# Patient Record
Sex: FEMALE | Race: OTHER | NOT HISPANIC OR LATINO | URBAN - METROPOLITAN AREA
[De-identification: names, ages, dates, MRNs, and addresses within clinical notes are randomized per-mention and may not be internally consistent; named-entity substitution may affect disease eponyms.]

---

## 2018-03-31 ENCOUNTER — INPATIENT (INPATIENT)
Facility: HOSPITAL | Age: 48
LOS: 32 days | Discharge: HOME | End: 2018-05-03
Attending: PSYCHIATRY & NEUROLOGY | Admitting: PSYCHIATRY & NEUROLOGY
Payer: MEDICARE

## 2018-03-31 VITALS — SYSTOLIC BLOOD PRESSURE: 170 MMHG | DIASTOLIC BLOOD PRESSURE: 103 MMHG | RESPIRATION RATE: 16 BRPM | HEART RATE: 120 BPM

## 2018-03-31 DIAGNOSIS — F25.0 SCHIZOAFFECTIVE DISORDER, BIPOLAR TYPE: ICD-10-CM

## 2018-03-31 DIAGNOSIS — R69 ILLNESS, UNSPECIFIED: ICD-10-CM

## 2018-03-31 DIAGNOSIS — F29 UNSPECIFIED PSYCHOSIS NOT DUE TO A SUBSTANCE OR KNOWN PHYSIOLOGICAL CONDITION: ICD-10-CM

## 2018-03-31 DIAGNOSIS — F10.11 ALCOHOL ABUSE, IN REMISSION: ICD-10-CM

## 2018-03-31 LAB
ALBUMIN SERPL ELPH-MCNC: 4.8 G/DL — SIGNIFICANT CHANGE UP (ref 3.3–5)
ALP SERPL-CCNC: 107 U/L — SIGNIFICANT CHANGE UP (ref 40–120)
ALT FLD-CCNC: 21 U/L — SIGNIFICANT CHANGE UP (ref 4–33)
AMPHET UR-MCNC: NEGATIVE — SIGNIFICANT CHANGE UP
APAP SERPL-MCNC: < 15 UG/ML — LOW (ref 15–25)
APPEARANCE UR: CLEAR — SIGNIFICANT CHANGE UP
AST SERPL-CCNC: 19 U/L — SIGNIFICANT CHANGE UP (ref 4–32)
BACTERIA # UR AUTO: HIGH
BARBITURATES UR SCN-MCNC: NEGATIVE — SIGNIFICANT CHANGE UP
BASOPHILS # BLD AUTO: 0.04 K/UL — SIGNIFICANT CHANGE UP (ref 0–0.2)
BASOPHILS NFR BLD AUTO: 0.5 % — SIGNIFICANT CHANGE UP (ref 0–2)
BENZODIAZ UR-MCNC: NEGATIVE — SIGNIFICANT CHANGE UP
BILIRUB SERPL-MCNC: 0.4 MG/DL — SIGNIFICANT CHANGE UP (ref 0.2–1.2)
BILIRUB UR-MCNC: NEGATIVE — SIGNIFICANT CHANGE UP
BLOOD UR QL VISUAL: HIGH
BUN SERPL-MCNC: 8 MG/DL — SIGNIFICANT CHANGE UP (ref 7–23)
CALCIUM SERPL-MCNC: 9.3 MG/DL — SIGNIFICANT CHANGE UP (ref 8.4–10.5)
CANNABINOIDS UR-MCNC: NEGATIVE — SIGNIFICANT CHANGE UP
CHLORIDE SERPL-SCNC: 101 MMOL/L — SIGNIFICANT CHANGE UP (ref 98–107)
CO2 SERPL-SCNC: 21 MMOL/L — LOW (ref 22–31)
COCAINE METAB.OTHER UR-MCNC: NEGATIVE — SIGNIFICANT CHANGE UP
COLOR SPEC: YELLOW — SIGNIFICANT CHANGE UP
CREAT SERPL-MCNC: 0.89 MG/DL — SIGNIFICANT CHANGE UP (ref 0.5–1.3)
EOSINOPHIL # BLD AUTO: 0.12 K/UL — SIGNIFICANT CHANGE UP (ref 0–0.5)
EOSINOPHIL NFR BLD AUTO: 1.4 % — SIGNIFICANT CHANGE UP (ref 0–6)
ETHANOL BLD-MCNC: < 10 MG/DL — SIGNIFICANT CHANGE UP
GLUCOSE SERPL-MCNC: 148 MG/DL — HIGH (ref 70–99)
GLUCOSE UR-MCNC: NEGATIVE — SIGNIFICANT CHANGE UP
HCG SERPL-ACNC: < 5 MIU/ML — SIGNIFICANT CHANGE UP
HCT VFR BLD CALC: 45.3 % — HIGH (ref 34.5–45)
HGB BLD-MCNC: 15.1 G/DL — SIGNIFICANT CHANGE UP (ref 11.5–15.5)
IMM GRANULOCYTES # BLD AUTO: 0.03 # — SIGNIFICANT CHANGE UP
IMM GRANULOCYTES NFR BLD AUTO: 0.3 % — SIGNIFICANT CHANGE UP (ref 0–1.5)
KETONES UR-MCNC: NEGATIVE — SIGNIFICANT CHANGE UP
LEUKOCYTE ESTERASE UR-ACNC: NEGATIVE — SIGNIFICANT CHANGE UP
LYMPHOCYTES # BLD AUTO: 2.65 K/UL — SIGNIFICANT CHANGE UP (ref 1–3.3)
LYMPHOCYTES # BLD AUTO: 30.2 % — SIGNIFICANT CHANGE UP (ref 13–44)
MCHC RBC-ENTMCNC: 32.3 PG — SIGNIFICANT CHANGE UP (ref 27–34)
MCHC RBC-ENTMCNC: 33.3 % — SIGNIFICANT CHANGE UP (ref 32–36)
MCV RBC AUTO: 96.8 FL — SIGNIFICANT CHANGE UP (ref 80–100)
METHADONE UR-MCNC: NEGATIVE — SIGNIFICANT CHANGE UP
MONOCYTES # BLD AUTO: 0.8 K/UL — SIGNIFICANT CHANGE UP (ref 0–0.9)
MONOCYTES NFR BLD AUTO: 9.1 % — SIGNIFICANT CHANGE UP (ref 2–14)
MUCOUS THREADS # UR AUTO: SIGNIFICANT CHANGE UP
NEUTROPHILS # BLD AUTO: 5.13 K/UL — SIGNIFICANT CHANGE UP (ref 1.8–7.4)
NEUTROPHILS NFR BLD AUTO: 58.5 % — SIGNIFICANT CHANGE UP (ref 43–77)
NITRITE UR-MCNC: NEGATIVE — SIGNIFICANT CHANGE UP
NRBC # FLD: 0 — SIGNIFICANT CHANGE UP
OPIATES UR-MCNC: NEGATIVE — SIGNIFICANT CHANGE UP
OXYCODONE UR-MCNC: NEGATIVE — SIGNIFICANT CHANGE UP
PCP UR-MCNC: NEGATIVE — SIGNIFICANT CHANGE UP
PH UR: 6.5 — SIGNIFICANT CHANGE UP (ref 5–8)
PLATELET # BLD AUTO: 214 K/UL — SIGNIFICANT CHANGE UP (ref 150–400)
PMV BLD: 12.3 FL — SIGNIFICANT CHANGE UP (ref 7–13)
POTASSIUM SERPL-MCNC: 4.1 MMOL/L — SIGNIFICANT CHANGE UP (ref 3.5–5.3)
POTASSIUM SERPL-SCNC: 4.1 MMOL/L — SIGNIFICANT CHANGE UP (ref 3.5–5.3)
PROT SERPL-MCNC: 7.8 G/DL — SIGNIFICANT CHANGE UP (ref 6–8.3)
PROT UR-MCNC: 30 MG/DL — HIGH
RBC # BLD: 4.68 M/UL — SIGNIFICANT CHANGE UP (ref 3.8–5.2)
RBC # FLD: 12 % — SIGNIFICANT CHANGE UP (ref 10.3–14.5)
RBC CASTS # UR COMP ASSIST: HIGH (ref 0–?)
SALICYLATES SERPL-MCNC: < 5 MG/DL — LOW (ref 15–30)
SODIUM SERPL-SCNC: 140 MMOL/L — SIGNIFICANT CHANGE UP (ref 135–145)
SP GR SPEC: 1.01 — SIGNIFICANT CHANGE UP (ref 1–1.04)
SQUAMOUS # UR AUTO: SIGNIFICANT CHANGE UP
TSH SERPL-MCNC: 4.12 UIU/ML — SIGNIFICANT CHANGE UP (ref 0.27–4.2)
UROBILINOGEN FLD QL: NORMAL MG/DL — SIGNIFICANT CHANGE UP
WBC # BLD: 8.77 K/UL — SIGNIFICANT CHANGE UP (ref 3.8–10.5)
WBC # FLD AUTO: 8.77 K/UL — SIGNIFICANT CHANGE UP (ref 3.8–10.5)
WBC UR QL: SIGNIFICANT CHANGE UP (ref 0–?)

## 2018-03-31 PROCEDURE — 99285 EMERGENCY DEPT VISIT HI MDM: CPT

## 2018-03-31 RX ORDER — OLANZAPINE 15 MG/1
10 TABLET, FILM COATED ORAL AT BEDTIME
Qty: 0 | Refills: 0 | Status: DISCONTINUED | OUTPATIENT
Start: 2018-03-31 | End: 2018-04-04

## 2018-03-31 RX ORDER — HALOPERIDOL DECANOATE 100 MG/ML
5 INJECTION INTRAMUSCULAR EVERY 6 HOURS
Qty: 0 | Refills: 0 | Status: DISCONTINUED | OUTPATIENT
Start: 2018-03-31 | End: 2018-05-03

## 2018-03-31 RX ORDER — DIPHENHYDRAMINE HCL 50 MG
50 CAPSULE ORAL EVERY 6 HOURS
Qty: 0 | Refills: 0 | Status: DISCONTINUED | OUTPATIENT
Start: 2018-03-31 | End: 2018-05-03

## 2018-03-31 RX ORDER — LEVOTHYROXINE SODIUM 125 MCG
25 TABLET ORAL DAILY
Qty: 0 | Refills: 0 | Status: DISCONTINUED | OUTPATIENT
Start: 2018-03-31 | End: 2018-03-31

## 2018-03-31 RX ORDER — CLONAZEPAM 1 MG
1 TABLET ORAL AT BEDTIME
Qty: 0 | Refills: 0 | Status: DISCONTINUED | OUTPATIENT
Start: 2018-03-31 | End: 2018-04-04

## 2018-03-31 RX ORDER — HALOPERIDOL DECANOATE 100 MG/ML
5 INJECTION INTRAMUSCULAR ONCE
Qty: 0 | Refills: 0 | Status: DISCONTINUED | OUTPATIENT
Start: 2018-03-31 | End: 2018-05-03

## 2018-03-31 RX ORDER — DIPHENHYDRAMINE HCL 50 MG
50 CAPSULE ORAL ONCE
Qty: 0 | Refills: 0 | Status: DISCONTINUED | OUTPATIENT
Start: 2018-03-31 | End: 2018-05-03

## 2018-03-31 RX ORDER — ATORVASTATIN CALCIUM 80 MG/1
20 TABLET, FILM COATED ORAL AT BEDTIME
Qty: 0 | Refills: 0 | Status: DISCONTINUED | OUTPATIENT
Start: 2018-03-31 | End: 2018-03-31

## 2018-03-31 RX ADMIN — Medication 1 MILLIGRAM(S): at 20:32

## 2018-03-31 RX ADMIN — Medication 2 MILLIGRAM(S): at 14:57

## 2018-03-31 NOTE — ED BEHAVIORAL HEALTH ASSESSMENT NOTE - DESCRIPTION
HLD, hypothyroidism see hpi agitated irritable   ICU Vital Signs Last 24 Hrs  T(C): 36.8 (31 Mar 2018 14:56), Max: 36.8 (31 Mar 2018 14:56)  T(F): 98.2 (31 Mar 2018 14:56), Max: 98.2 (31 Mar 2018 14:56)  HR: 83 (31 Mar 2018 16:09) (83 - 126)  BP: 141/89 (31 Mar 2018 14:56) (141/89 - 170/103)  BP(mean): --  ABP: --  ABP(mean): --  RR: 16 (31 Mar 2018 14:56) (16 - 16)  SpO2: 100% (31 Mar 2018 14:56) (100% - 100%) agitated, irritable     ICU Vital Signs Last 24 Hrs  T(C): 36.8 (31 Mar 2018 14:56), Max: 36.8 (31 Mar 2018 14:56)  T(F): 98.2 (31 Mar 2018 14:56), Max: 98.2 (31 Mar 2018 14:56)  HR: 83 (31 Mar 2018 16:09) (83 - 126)  BP: 141/89 (31 Mar 2018 14:56) (141/89 - 170/103)  BP(mean): --  ABP: --  ABP(mean): --  RR: 16 (31 Mar 2018 14:56) (16 - 16)  SpO2: 100% (31 Mar 2018 14:56) (100% - 100%)

## 2018-03-31 NOTE — ED BEHAVIORAL HEALTH ASSESSMENT NOTE - OTHER PAST PSYCHIATRIC HISTORY (INCLUDE DETAILS REGARDING ONSET, COURSE OF ILLNESS, INPATIENT/OUTPATIENT TREATMENT)
History of at least 6 inpatient psychiatric hospitalizations. History of having AOT & ACT in the past, but not now per patient.

## 2018-03-31 NOTE — ED BEHAVIORAL HEALTH ASSESSMENT NOTE - CASE SUMMARY
46 y/o single  female, domiciled in an apartment, no dependents, disabled, history of Bipolar vs Schizoaffective Disorder, per chart hx of Alcohol Abuse, history of multiple inpatient psychiatric hospitalizations, in outpatient treatment at the [facility], BIB EMS from home after a staff member at [facility] called 911 due to concern for pt's safety in context of worsening psychosis & medication noncompliance.  In ED, patient is irritable, agitated, psychotic & grandiose; she is reporting medication noncompliance but is not able to engage in safety planning due to severity of symptoms.  Patient is at high risk for harm and will benefit from inpatient psychiatric hospitalization for safety and stabilization.  Admit to [place] [id number] on a 9.39, involuntary legal status.  No need for constant observation in a locked, supervised setting.  EMS transportation to unit with buckle guard for safety.

## 2018-03-31 NOTE — ED BEHAVIORAL HEALTH ASSESSMENT NOTE - PSYCHIATRIC ISSUES AND PLAN (INCLUDE STANDING AND PRN MEDICATION)
Start Zyprexa 10mg q HS for psychosis. Klonopin 1mg q HS PRN for insomnia. Provide Haldol 5mg q 6 hours PRN for agitation (IM or p.o.), Ativan 2mg q 6 hours PRN for agitation (IM or p.o.), Benadryl 50mg q 6 hours PRN for agitation (IM or p.o.) Start Zyprexa 10mg q HS for psychosis. Klonopin 1mg q HS for agitation/anxiety. Provide Haldol 5mg q 6 hours PRN for agitation (IM or p.o.), Ativan 2mg q 6 hours PRN for agitation (IM or p.o.), Benadryl 50mg q 6 hours PRN for agitation (IM or p.o.)

## 2018-03-31 NOTE — ED BEHAVIORAL HEALTH ASSESSMENT NOTE - MEDICAL ISSUES AND PLAN (INCLUDE STANDING AND PRN MEDICATION)
Restart Synthroid 25mcg qd for hypothyroidism, restart Lipitor 20mg q HS for HLD Patient was medically cleared by DAVID Miller; Unclear what medical medications patient was most recently taking, but could consider restarting Synthroid 25mcg qd for hypothyroidism, restart Lipitor 20mg q HS for HLD

## 2018-03-31 NOTE — ED ADULT NURSE NOTE - OBJECTIVE STATEMENT
Received pt in  pt  bought in by EMS & [employer] from home for non compliance of meds  & agitation, pt denies Si/Hi/AVh at present eval on going.

## 2018-03-31 NOTE — ED BEHAVIORAL HEALTH ASSESSMENT NOTE - HPI (INCLUDE ILLNESS QUALITY, SEVERITY, DURATION, TIMING, CONTEXT, MODIFYING FACTORS, ASSOCIATED SIGNS AND SYMPTOMS)
48 y/o single white female, domiciled in an apartment, no dependents, disabled, history of Bipolar vs Schizoaffective Disorder, per chart hx of Alcohol Abuse, history of multiple inpatient psychiatric hospitalizations, in outpatient treatment at the [facility], denies hx of suicide attempts, history of agitation but unclear if history of aggression, no known legal issues, a history of ACT/AOT but not known at present, denies any substance use in years, denies hx of DTs, PMH of HLD & hypothyroidism, BIB EMS from home after a staff member at [facility] called 911 due to concern for pt's safety in context of worsening psychosis & medication noncompliance.    PSYCKES was checked and did not have any relevant information as patient has Medicare.     EMS and police escorted patient into  area & provided writer with a phone number for referent named Radha Pardo, phone #[phone]. They state they received a call that patient was psychotic and medication noncompliant. However, this phone number did not work. They report that patient would not answer the door & ESU was called & they entered patient's home by force. They state patient was agitated & not making sense.    Patient arrives in handcuffs & presents as markedly labile; at one point she is extremely irritable, but later euphoric and overly friendly. She is disorganized, labile & refuses to answer most questions posed to her. She states she is here because, "my medications worse off" & reports she has not been going to appointments because "they gave me an apple". Patient reports she is feeling very "stressed" as she moved into her current apartment with a man who has gone missing, believes her rent may not be paid, states her landlord may be stealing from her, states people are putting things in her mailbox in order to harass her. She states she has not been taking her psychiatric medications, which she cannot name, for "9 weeks". She reports that she has been working hard at "getting a job" & states she has multiple "side projects" she is working on but will not discuss what they are. 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 Patient refuses to answer questions and suicide/homicide. 48 y/o single white female, domiciled in an apartment, no dependents, disabled, history of Bipolar vs Schizoaffective Disorder, per chart hx of Alcohol Abuse, history of multiple inpatient psychiatric hospitalizations, in outpatient treatment at the [facility], denies hx of suicide attempts, history of agitation but unclear if history of aggression, no known legal issues, a history of ACT/AOT but not known at present, denies any substance use in years, denies hx of DTs, PMH of HLD & hypothyroidism, BIB EMS from home after a staff member at [facility] called 911 due to concern for pt's safety in context of worsening psychosis & medication noncompliance.    PSYCKES was checked and did not have any relevant information as patient has Medicare. Was able to review records from [facility] which indicate patient was treated in 2015 for psychosis & alcohol abuse. During hospitalization, record shows patient was very agitated, irritable, psychotic & was treated with Haldol. She previously had an ACT team per records. CV indicates patient was treated for an Eating Disorder in the past (1980s) but cannot access record details.     EMS and police escorted patient into  area & provided writer with a phone number for referent named Radha Pardo, phone #[phone]. They state they received a call that patient was psychotic and medication noncompliant. However, this phone number did not work. They report that patient would not answer the door & ESU was called & they entered patient's home by force. They state patient was agitated & not making sense.    Patient arrives in handcuffs & presents as markedly labile; at one point she is extremely irritable, but later euphoric and overly friendly. She is disorganized, labile & refuses to answer most questions posed to her. She states she is here because, "my medications worse off" & reports she has not been going to appointments because "they gave me an apple". Patient reports she is feeling very "stressed" as she moved into her current apartment with a man who has gone missing, believes her rent may not be paid, states her landlord may be stealing from her, states people are putting things in her mailbox in order to harass her. She states she has not been taking her psychiatric medications, which she cannot name, for "9 weeks". She reports that she has been working hard at "getting a job" & states she has multiple "side projects" she is working on but will not discuss what they are. 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 Patient refuses to answer questions and suicide/homicide.

## 2018-03-31 NOTE — ED PROVIDER NOTE - OBJECTIVE STATEMENT
48 y/o F hx Schizophrenia BIBA w  c/o  aggression and bizarre behaviour secondary to medication non [id number] x 9 weeks. 46 y/o F hx Schizophrenia BIBA w  c/o  aggression and bizarre behaviour secondary to medication non [id number] x 9 weeks. EMS reported that patient barricaded herself in a room.  Denies falling, punching or kicking any objects.  Denies pain, SOB, fever, chills, chest/ abdominal discomfort. Denies SI/HI/AH/VH. Denies  recent use of alcohol or illicit drugs.

## 2018-03-31 NOTE — ED BEHAVIORAL HEALTH ASSESSMENT NOTE - RISK ASSESSMENT
Risk factors include history of mental illness, noncompliance with medications, hx of substance abuse, living alone, impulsivity, impaired insight & judgement. This patient is at high risk for harm and requires inpatient level of care for safety and stabilization.

## 2018-03-31 NOTE — ED ADULT TRIAGE NOTE - CHIEF COMPLAINT QUOTE
pt bibems from home, handcuffed, pt had barricaded herself in her home, hx of schizophrenia, non complaint with medication x 9 weeks, agitated and confrontational in triage. denies any drug or alcohol use, denies any auditory or visual hallucinations. would not allow for temp to be taken. would not answer questions about si or hi

## 2018-03-31 NOTE — ED PROVIDER NOTE - MEDICAL DECISION MAKING DETAILS
46 y/o F hx Schizophrenia  Labs, Urine Tox/UA, EKG, HCG.   Medical evaluation performed. There is no clinical evidence of intoxication or any acute medical problem requiring immediate intervention. Patient is awaiting psychiatric consultation. Final disposition will be determined by psychiatrist.

## 2018-03-31 NOTE — ED BEHAVIORAL HEALTH ASSESSMENT NOTE - SUICIDE RISK FACTORS
Access to means (pills, firearms, etc.)/Agitation/severe anxiety/Unable to engage in safety planning/Mood episode

## 2018-03-31 NOTE — ED BEHAVIORAL HEALTH ASSESSMENT NOTE - SUMMARY
48 y/o single white female, domiciled in an apartment, no dependents, disabled, history of Bipolar vs Schizoaffective Disorder, per chart hx of Alcohol Abuse, history of multiple inpatient psychiatric hospitalizations, in outpatient treatment at the [facility], BIB EMS from home after a staff member at [facility] called 911 due to concern for pt's safety in context of worsening psychosis & medication noncompliance.  In ED, patient is irritable, agitated, psychotic & grandiose; she is reporting medication noncompliance & is not able to engage in safety planning due to severity of symptoms. Patient is at high risk for harm and will benefit from inpatient psychiatric hospitalization for safety and stabilization.

## 2018-03-31 NOTE — ED BEHAVIORAL HEALTH ASSESSMENT NOTE - DESCRIPTION (FIRST USE, LAST USE, QUANTITY, FREQUENCY, DURATION)
15 cigarettes per day per pt per chart, patient has history of alcohol abuse, she denies any recent use & tox is negative

## 2018-04-01 PROCEDURE — 99223 1ST HOSP IP/OBS HIGH 75: CPT

## 2018-04-01 RX ORDER — OLANZAPINE 15 MG/1
5 TABLET, FILM COATED ORAL DAILY
Qty: 0 | Refills: 0 | Status: DISCONTINUED | OUTPATIENT
Start: 2018-04-01 | End: 2018-04-03

## 2018-04-01 RX ORDER — ACETAMINOPHEN 500 MG
650 TABLET ORAL EVERY 6 HOURS
Qty: 0 | Refills: 0 | Status: DISCONTINUED | OUTPATIENT
Start: 2018-04-01 | End: 2018-05-03

## 2018-04-01 RX ADMIN — Medication 50 MILLIGRAM(S): at 22:20

## 2018-04-01 RX ADMIN — Medication 50 MILLIGRAM(S): at 16:19

## 2018-04-01 RX ADMIN — HALOPERIDOL DECANOATE 5 MILLIGRAM(S): 100 INJECTION INTRAMUSCULAR at 16:19

## 2018-04-01 RX ADMIN — HALOPERIDOL DECANOATE 5 MILLIGRAM(S): 100 INJECTION INTRAMUSCULAR at 22:20

## 2018-04-01 RX ADMIN — Medication 2 MILLIGRAM(S): at 22:22

## 2018-04-01 RX ADMIN — Medication 1 MILLIGRAM(S): at 21:06

## 2018-04-02 RX ORDER — NICOTINE POLACRILEX 2 MG
1 GUM BUCCAL
Qty: 0 | Refills: 0 | Status: DISCONTINUED | OUTPATIENT
Start: 2018-04-02 | End: 2018-05-03

## 2018-04-02 RX ADMIN — Medication 1 EACH: at 19:08

## 2018-04-02 RX ADMIN — Medication 1 MILLIGRAM(S): at 21:47

## 2018-04-02 RX ADMIN — OLANZAPINE 10 MILLIGRAM(S): 15 TABLET, FILM COATED ORAL at 21:47

## 2018-04-02 RX ADMIN — OLANZAPINE 5 MILLIGRAM(S): 15 TABLET, FILM COATED ORAL at 09:54

## 2018-04-03 RX ORDER — BENZOCAINE AND MENTHOL 5; 1 G/100ML; G/100ML
1 LIQUID ORAL EVERY 6 HOURS
Qty: 0 | Refills: 0 | Status: DISCONTINUED | OUTPATIENT
Start: 2018-04-03 | End: 2018-05-03

## 2018-04-03 RX ORDER — OLANZAPINE 15 MG/1
10 TABLET, FILM COATED ORAL DAILY
Qty: 0 | Refills: 0 | Status: DISCONTINUED | OUTPATIENT
Start: 2018-04-03 | End: 2018-04-04

## 2018-04-03 RX ADMIN — BENZOCAINE AND MENTHOL 1 LOZENGE: 5; 1 LIQUID ORAL at 11:45

## 2018-04-03 RX ADMIN — Medication 2 MILLIGRAM(S): at 22:15

## 2018-04-03 RX ADMIN — OLANZAPINE 5 MILLIGRAM(S): 15 TABLET, FILM COATED ORAL at 08:41

## 2018-04-03 RX ADMIN — Medication 50 MILLIGRAM(S): at 22:15

## 2018-04-03 RX ADMIN — Medication 1 MILLIGRAM(S): at 20:37

## 2018-04-03 RX ADMIN — OLANZAPINE 10 MILLIGRAM(S): 15 TABLET, FILM COATED ORAL at 20:37

## 2018-04-04 PROCEDURE — 99232 SBSQ HOSP IP/OBS MODERATE 35: CPT

## 2018-04-04 RX ORDER — OLANZAPINE 15 MG/1
5 TABLET, FILM COATED ORAL ONCE
Qty: 0 | Refills: 0 | Status: COMPLETED | OUTPATIENT
Start: 2018-04-04 | End: 2018-04-04

## 2018-04-04 RX ORDER — NICOTINE POLACRILEX 2 MG
1 GUM BUCCAL DAILY
Qty: 0 | Refills: 0 | Status: DISCONTINUED | OUTPATIENT
Start: 2018-04-04 | End: 2018-05-03

## 2018-04-04 RX ORDER — NICOTINE POLACRILEX 2 MG
1 GUM BUCCAL DAILY
Qty: 0 | Refills: 0 | Status: DISCONTINUED | OUTPATIENT
Start: 2018-04-04 | End: 2018-04-04

## 2018-04-04 RX ORDER — OLANZAPINE 15 MG/1
15 TABLET, FILM COATED ORAL
Qty: 0 | Refills: 0 | Status: DISCONTINUED | OUTPATIENT
Start: 2018-04-04 | End: 2018-04-19

## 2018-04-04 RX ORDER — CLONAZEPAM 1 MG
1 TABLET ORAL
Qty: 0 | Refills: 0 | Status: DISCONTINUED | OUTPATIENT
Start: 2018-04-04 | End: 2018-04-06

## 2018-04-04 RX ORDER — IBUPROFEN 200 MG
600 TABLET ORAL ONCE
Qty: 0 | Refills: 0 | Status: COMPLETED | OUTPATIENT
Start: 2018-04-04 | End: 2018-04-04

## 2018-04-04 RX ORDER — DIVALPROEX SODIUM 500 MG/1
500 TABLET, DELAYED RELEASE ORAL
Qty: 0 | Refills: 0 | Status: DISCONTINUED | OUTPATIENT
Start: 2018-04-04 | End: 2018-04-19

## 2018-04-04 RX ADMIN — OLANZAPINE 15 MILLIGRAM(S): 15 TABLET, FILM COATED ORAL at 22:57

## 2018-04-04 RX ADMIN — Medication 1 MILLIGRAM(S): at 22:57

## 2018-04-04 RX ADMIN — Medication 1 PATCH: at 14:52

## 2018-04-04 RX ADMIN — HALOPERIDOL DECANOATE 5 MILLIGRAM(S): 100 INJECTION INTRAMUSCULAR at 22:35

## 2018-04-04 RX ADMIN — Medication 1 EACH: at 14:14

## 2018-04-04 RX ADMIN — DIVALPROEX SODIUM 500 MILLIGRAM(S): 500 TABLET, DELAYED RELEASE ORAL at 22:57

## 2018-04-04 RX ADMIN — OLANZAPINE 10 MILLIGRAM(S): 15 TABLET, FILM COATED ORAL at 08:59

## 2018-04-04 RX ADMIN — Medication 50 MILLIGRAM(S): at 22:35

## 2018-04-04 RX ADMIN — OLANZAPINE 5 MILLIGRAM(S): 15 TABLET, FILM COATED ORAL at 10:56

## 2018-04-04 RX ADMIN — Medication 50 MILLIGRAM(S): at 08:59

## 2018-04-04 RX ADMIN — Medication 600 MILLIGRAM(S): at 22:35

## 2018-04-04 RX ADMIN — Medication 2 MILLIGRAM(S): at 10:56

## 2018-04-05 PROCEDURE — 99232 SBSQ HOSP IP/OBS MODERATE 35: CPT

## 2018-04-05 RX ORDER — IBUPROFEN 200 MG
400 TABLET ORAL ONCE
Qty: 0 | Refills: 0 | Status: COMPLETED | OUTPATIENT
Start: 2018-04-05 | End: 2018-04-24

## 2018-04-05 RX ADMIN — OLANZAPINE 15 MILLIGRAM(S): 15 TABLET, FILM COATED ORAL at 11:00

## 2018-04-05 RX ADMIN — Medication 600 MILLIGRAM(S): at 00:05

## 2018-04-05 RX ADMIN — OLANZAPINE 15 MILLIGRAM(S): 15 TABLET, FILM COATED ORAL at 21:01

## 2018-04-05 RX ADMIN — Medication 1 MILLIGRAM(S): at 21:00

## 2018-04-05 RX ADMIN — Medication 1 MILLIGRAM(S): at 11:00

## 2018-04-05 RX ADMIN — Medication 650 MILLIGRAM(S): at 07:14

## 2018-04-05 RX ADMIN — Medication 650 MILLIGRAM(S): at 00:48

## 2018-04-05 RX ADMIN — DIVALPROEX SODIUM 500 MILLIGRAM(S): 500 TABLET, DELAYED RELEASE ORAL at 11:00

## 2018-04-06 PROCEDURE — 99232 SBSQ HOSP IP/OBS MODERATE 35: CPT

## 2018-04-06 RX ORDER — CLONAZEPAM 1 MG
2 TABLET ORAL
Qty: 0 | Refills: 0 | Status: DISCONTINUED | OUTPATIENT
Start: 2018-04-06 | End: 2018-04-12

## 2018-04-06 RX ADMIN — DIVALPROEX SODIUM 500 MILLIGRAM(S): 500 TABLET, DELAYED RELEASE ORAL at 20:53

## 2018-04-06 RX ADMIN — OLANZAPINE 15 MILLIGRAM(S): 15 TABLET, FILM COATED ORAL at 10:57

## 2018-04-06 RX ADMIN — Medication 1 MILLIGRAM(S): at 10:56

## 2018-04-06 RX ADMIN — Medication 2 MILLIGRAM(S): at 20:44

## 2018-04-06 RX ADMIN — OLANZAPINE 15 MILLIGRAM(S): 15 TABLET, FILM COATED ORAL at 20:44

## 2018-04-07 RX ADMIN — Medication 2 MILLIGRAM(S): at 21:49

## 2018-04-07 RX ADMIN — OLANZAPINE 15 MILLIGRAM(S): 15 TABLET, FILM COATED ORAL at 21:49

## 2018-04-07 RX ADMIN — Medication 1 PATCH: at 10:16

## 2018-04-07 RX ADMIN — OLANZAPINE 15 MILLIGRAM(S): 15 TABLET, FILM COATED ORAL at 10:16

## 2018-04-07 RX ADMIN — DIVALPROEX SODIUM 500 MILLIGRAM(S): 500 TABLET, DELAYED RELEASE ORAL at 10:16

## 2018-04-07 RX ADMIN — Medication 2 MILLIGRAM(S): at 10:16

## 2018-04-08 RX ORDER — LANOLIN ALCOHOL/MO/W.PET/CERES
3 CREAM (GRAM) TOPICAL ONCE
Qty: 0 | Refills: 0 | Status: COMPLETED | OUTPATIENT
Start: 2018-04-08 | End: 2018-04-08

## 2018-04-08 RX ORDER — TRAZODONE HCL 50 MG
50 TABLET ORAL ONCE
Qty: 0 | Refills: 0 | Status: COMPLETED | OUTPATIENT
Start: 2018-04-08 | End: 2018-04-08

## 2018-04-08 RX ADMIN — HALOPERIDOL DECANOATE 5 MILLIGRAM(S): 100 INJECTION INTRAMUSCULAR at 21:30

## 2018-04-08 RX ADMIN — Medication 650 MILLIGRAM(S): at 02:33

## 2018-04-08 RX ADMIN — Medication 50 MILLIGRAM(S): at 21:30

## 2018-04-08 RX ADMIN — DIVALPROEX SODIUM 500 MILLIGRAM(S): 500 TABLET, DELAYED RELEASE ORAL at 21:31

## 2018-04-08 RX ADMIN — OLANZAPINE 15 MILLIGRAM(S): 15 TABLET, FILM COATED ORAL at 21:31

## 2018-04-08 RX ADMIN — Medication 2 MILLIGRAM(S): at 10:23

## 2018-04-08 RX ADMIN — Medication 3 MILLIGRAM(S): at 23:40

## 2018-04-08 RX ADMIN — Medication 650 MILLIGRAM(S): at 01:45

## 2018-04-08 RX ADMIN — Medication 1 PATCH: at 10:23

## 2018-04-08 RX ADMIN — DIVALPROEX SODIUM 500 MILLIGRAM(S): 500 TABLET, DELAYED RELEASE ORAL at 10:23

## 2018-04-08 RX ADMIN — Medication 2 MILLIGRAM(S): at 21:31

## 2018-04-08 RX ADMIN — Medication 50 MILLIGRAM(S): at 02:33

## 2018-04-08 RX ADMIN — Medication 50 MILLIGRAM(S): at 01:45

## 2018-04-08 RX ADMIN — OLANZAPINE 15 MILLIGRAM(S): 15 TABLET, FILM COATED ORAL at 10:23

## 2018-04-09 PROCEDURE — 99232 SBSQ HOSP IP/OBS MODERATE 35: CPT

## 2018-04-09 RX ADMIN — OLANZAPINE 15 MILLIGRAM(S): 15 TABLET, FILM COATED ORAL at 10:31

## 2018-04-09 RX ADMIN — BENZOCAINE AND MENTHOL 1 LOZENGE: 5; 1 LIQUID ORAL at 14:44

## 2018-04-09 RX ADMIN — Medication 2 MILLIGRAM(S): at 21:36

## 2018-04-09 RX ADMIN — DIVALPROEX SODIUM 500 MILLIGRAM(S): 500 TABLET, DELAYED RELEASE ORAL at 10:31

## 2018-04-09 RX ADMIN — Medication 1 PATCH: at 10:31

## 2018-04-09 RX ADMIN — OLANZAPINE 15 MILLIGRAM(S): 15 TABLET, FILM COATED ORAL at 21:36

## 2018-04-09 RX ADMIN — Medication 2 MILLIGRAM(S): at 10:31

## 2018-04-10 PROCEDURE — 99232 SBSQ HOSP IP/OBS MODERATE 35: CPT

## 2018-04-10 RX ADMIN — BENZOCAINE AND MENTHOL 1 LOZENGE: 5; 1 LIQUID ORAL at 01:00

## 2018-04-10 RX ADMIN — OLANZAPINE 15 MILLIGRAM(S): 15 TABLET, FILM COATED ORAL at 10:16

## 2018-04-10 RX ADMIN — Medication 650 MILLIGRAM(S): at 01:30

## 2018-04-10 RX ADMIN — HALOPERIDOL DECANOATE 5 MILLIGRAM(S): 100 INJECTION INTRAMUSCULAR at 20:45

## 2018-04-10 RX ADMIN — Medication 650 MILLIGRAM(S): at 01:00

## 2018-04-10 RX ADMIN — Medication 650 MILLIGRAM(S): at 19:02

## 2018-04-10 RX ADMIN — Medication 2 MILLIGRAM(S): at 10:15

## 2018-04-10 RX ADMIN — Medication 50 MILLIGRAM(S): at 01:01

## 2018-04-10 RX ADMIN — Medication 1 PATCH: at 10:16

## 2018-04-10 RX ADMIN — Medication 50 MILLIGRAM(S): at 20:45

## 2018-04-10 RX ADMIN — OLANZAPINE 15 MILLIGRAM(S): 15 TABLET, FILM COATED ORAL at 20:48

## 2018-04-10 RX ADMIN — DIVALPROEX SODIUM 500 MILLIGRAM(S): 500 TABLET, DELAYED RELEASE ORAL at 20:48

## 2018-04-10 RX ADMIN — Medication 650 MILLIGRAM(S): at 20:19

## 2018-04-10 RX ADMIN — Medication 650 MILLIGRAM(S): at 12:56

## 2018-04-10 RX ADMIN — Medication 2 MILLIGRAM(S): at 20:48

## 2018-04-11 PROCEDURE — 99232 SBSQ HOSP IP/OBS MODERATE 35: CPT

## 2018-04-11 RX ORDER — NICOTINE POLACRILEX 2 MG
1 GUM BUCCAL ONCE
Qty: 0 | Refills: 0 | Status: COMPLETED | OUTPATIENT
Start: 2018-04-11 | End: 2018-04-11

## 2018-04-11 RX ORDER — LANOLIN ALCOHOL/MO/W.PET/CERES
3 CREAM (GRAM) TOPICAL ONCE
Qty: 0 | Refills: 0 | Status: COMPLETED | OUTPATIENT
Start: 2018-04-11 | End: 2018-04-11

## 2018-04-11 RX ADMIN — OLANZAPINE 15 MILLIGRAM(S): 15 TABLET, FILM COATED ORAL at 12:49

## 2018-04-11 RX ADMIN — BENZOCAINE AND MENTHOL 1 LOZENGE: 5; 1 LIQUID ORAL at 22:40

## 2018-04-11 RX ADMIN — Medication 650 MILLIGRAM(S): at 21:27

## 2018-04-11 RX ADMIN — HALOPERIDOL DECANOATE 5 MILLIGRAM(S): 100 INJECTION INTRAMUSCULAR at 21:29

## 2018-04-11 RX ADMIN — Medication 1 PATCH: at 12:49

## 2018-04-11 RX ADMIN — DIVALPROEX SODIUM 500 MILLIGRAM(S): 500 TABLET, DELAYED RELEASE ORAL at 12:49

## 2018-04-11 RX ADMIN — OLANZAPINE 15 MILLIGRAM(S): 15 TABLET, FILM COATED ORAL at 21:27

## 2018-04-11 RX ADMIN — Medication 3 MILLIGRAM(S): at 23:25

## 2018-04-11 RX ADMIN — Medication 650 MILLIGRAM(S): at 22:40

## 2018-04-11 RX ADMIN — Medication 50 MILLIGRAM(S): at 21:29

## 2018-04-11 RX ADMIN — Medication 2 MILLIGRAM(S): at 21:27

## 2018-04-11 RX ADMIN — DIVALPROEX SODIUM 500 MILLIGRAM(S): 500 TABLET, DELAYED RELEASE ORAL at 21:27

## 2018-04-11 RX ADMIN — Medication 2 MILLIGRAM(S): at 12:49

## 2018-04-11 RX ADMIN — Medication 1 PATCH: at 21:27

## 2018-04-12 PROCEDURE — 99232 SBSQ HOSP IP/OBS MODERATE 35: CPT

## 2018-04-12 RX ORDER — CLONAZEPAM 1 MG
2 TABLET ORAL
Qty: 0 | Refills: 0 | Status: DISCONTINUED | OUTPATIENT
Start: 2018-04-12 | End: 2018-04-19

## 2018-04-12 RX ADMIN — Medication 1 PATCH: at 08:56

## 2018-04-12 RX ADMIN — Medication 1 PATCH: at 08:55

## 2018-04-12 RX ADMIN — OLANZAPINE 15 MILLIGRAM(S): 15 TABLET, FILM COATED ORAL at 23:18

## 2018-04-12 RX ADMIN — DIVALPROEX SODIUM 500 MILLIGRAM(S): 500 TABLET, DELAYED RELEASE ORAL at 23:08

## 2018-04-12 RX ADMIN — Medication 30 MILLILITER(S): at 01:52

## 2018-04-12 RX ADMIN — Medication 2 MILLIGRAM(S): at 23:18

## 2018-04-12 RX ADMIN — OLANZAPINE 15 MILLIGRAM(S): 15 TABLET, FILM COATED ORAL at 08:56

## 2018-04-12 RX ADMIN — DIVALPROEX SODIUM 500 MILLIGRAM(S): 500 TABLET, DELAYED RELEASE ORAL at 08:55

## 2018-04-12 RX ADMIN — Medication 2 MILLIGRAM(S): at 08:55

## 2018-04-12 RX ADMIN — Medication 650 MILLIGRAM(S): at 03:59

## 2018-04-13 PROCEDURE — 99232 SBSQ HOSP IP/OBS MODERATE 35: CPT

## 2018-04-13 RX ORDER — LITHIUM CARBONATE 300 MG/1
300 TABLET, EXTENDED RELEASE ORAL
Qty: 0 | Refills: 0 | Status: DISCONTINUED | OUTPATIENT
Start: 2018-04-13 | End: 2018-04-17

## 2018-04-13 RX ADMIN — Medication 1 PATCH: at 09:07

## 2018-04-13 RX ADMIN — DIVALPROEX SODIUM 500 MILLIGRAM(S): 500 TABLET, DELAYED RELEASE ORAL at 09:07

## 2018-04-13 RX ADMIN — OLANZAPINE 15 MILLIGRAM(S): 15 TABLET, FILM COATED ORAL at 09:07

## 2018-04-13 RX ADMIN — HALOPERIDOL DECANOATE 5 MILLIGRAM(S): 100 INJECTION INTRAMUSCULAR at 01:46

## 2018-04-13 RX ADMIN — Medication 2 MILLIGRAM(S): at 09:07

## 2018-04-13 RX ADMIN — OLANZAPINE 15 MILLIGRAM(S): 15 TABLET, FILM COATED ORAL at 22:23

## 2018-04-13 RX ADMIN — Medication 50 MILLIGRAM(S): at 01:46

## 2018-04-13 RX ADMIN — Medication 2 MILLIGRAM(S): at 22:23

## 2018-04-13 RX ADMIN — DIVALPROEX SODIUM 500 MILLIGRAM(S): 500 TABLET, DELAYED RELEASE ORAL at 22:23

## 2018-04-13 RX ADMIN — LITHIUM CARBONATE 300 MILLIGRAM(S): 300 TABLET, EXTENDED RELEASE ORAL at 22:23

## 2018-04-14 RX ORDER — LANOLIN ALCOHOL/MO/W.PET/CERES
6 CREAM (GRAM) TOPICAL ONCE
Qty: 0 | Refills: 0 | Status: COMPLETED | OUTPATIENT
Start: 2018-04-14 | End: 2018-04-14

## 2018-04-14 RX ADMIN — Medication 2 MILLIGRAM(S): at 21:12

## 2018-04-14 RX ADMIN — DIVALPROEX SODIUM 500 MILLIGRAM(S): 500 TABLET, DELAYED RELEASE ORAL at 10:01

## 2018-04-14 RX ADMIN — Medication 50 MILLIGRAM(S): at 23:17

## 2018-04-14 RX ADMIN — Medication 6 MILLIGRAM(S): at 23:42

## 2018-04-14 RX ADMIN — DIVALPROEX SODIUM 500 MILLIGRAM(S): 500 TABLET, DELAYED RELEASE ORAL at 21:12

## 2018-04-14 RX ADMIN — OLANZAPINE 15 MILLIGRAM(S): 15 TABLET, FILM COATED ORAL at 21:12

## 2018-04-14 RX ADMIN — Medication 2 MILLIGRAM(S): at 10:01

## 2018-04-14 RX ADMIN — Medication 1 PATCH: at 10:12

## 2018-04-14 RX ADMIN — LITHIUM CARBONATE 300 MILLIGRAM(S): 300 TABLET, EXTENDED RELEASE ORAL at 10:02

## 2018-04-14 RX ADMIN — LITHIUM CARBONATE 300 MILLIGRAM(S): 300 TABLET, EXTENDED RELEASE ORAL at 21:12

## 2018-04-14 RX ADMIN — Medication 650 MILLIGRAM(S): at 23:31

## 2018-04-14 RX ADMIN — Medication 1 PATCH: at 10:03

## 2018-04-14 RX ADMIN — OLANZAPINE 15 MILLIGRAM(S): 15 TABLET, FILM COATED ORAL at 10:03

## 2018-04-15 RX ORDER — LANOLIN ALCOHOL/MO/W.PET/CERES
3 CREAM (GRAM) TOPICAL ONCE
Qty: 0 | Refills: 0 | Status: COMPLETED | OUTPATIENT
Start: 2018-04-15 | End: 2018-04-15

## 2018-04-15 RX ADMIN — Medication 2 MILLIGRAM(S): at 09:37

## 2018-04-15 RX ADMIN — LITHIUM CARBONATE 300 MILLIGRAM(S): 300 TABLET, EXTENDED RELEASE ORAL at 20:53

## 2018-04-15 RX ADMIN — Medication 650 MILLIGRAM(S): at 01:19

## 2018-04-15 RX ADMIN — DIVALPROEX SODIUM 500 MILLIGRAM(S): 500 TABLET, DELAYED RELEASE ORAL at 03:40

## 2018-04-15 RX ADMIN — DIVALPROEX SODIUM 500 MILLIGRAM(S): 500 TABLET, DELAYED RELEASE ORAL at 20:53

## 2018-04-15 RX ADMIN — OLANZAPINE 15 MILLIGRAM(S): 15 TABLET, FILM COATED ORAL at 20:53

## 2018-04-15 RX ADMIN — Medication 2 MILLIGRAM(S): at 20:53

## 2018-04-15 RX ADMIN — Medication 50 MILLIGRAM(S): at 21:45

## 2018-04-15 RX ADMIN — Medication 650 MILLIGRAM(S): at 16:20

## 2018-04-15 RX ADMIN — HALOPERIDOL DECANOATE 5 MILLIGRAM(S): 100 INJECTION INTRAMUSCULAR at 21:45

## 2018-04-15 RX ADMIN — LITHIUM CARBONATE 300 MILLIGRAM(S): 300 TABLET, EXTENDED RELEASE ORAL at 09:37

## 2018-04-15 RX ADMIN — Medication 1 PATCH: at 09:37

## 2018-04-15 RX ADMIN — Medication 650 MILLIGRAM(S): at 17:42

## 2018-04-15 RX ADMIN — Medication 3 MILLIGRAM(S): at 22:44

## 2018-04-15 RX ADMIN — OLANZAPINE 15 MILLIGRAM(S): 15 TABLET, FILM COATED ORAL at 09:37

## 2018-04-16 PROCEDURE — 99232 SBSQ HOSP IP/OBS MODERATE 35: CPT

## 2018-04-16 RX ADMIN — Medication 1 PATCH: at 11:00

## 2018-04-16 RX ADMIN — LITHIUM CARBONATE 300 MILLIGRAM(S): 300 TABLET, EXTENDED RELEASE ORAL at 20:43

## 2018-04-16 RX ADMIN — DIVALPROEX SODIUM 500 MILLIGRAM(S): 500 TABLET, DELAYED RELEASE ORAL at 11:00

## 2018-04-16 RX ADMIN — DIVALPROEX SODIUM 500 MILLIGRAM(S): 500 TABLET, DELAYED RELEASE ORAL at 20:43

## 2018-04-16 RX ADMIN — Medication 2 MILLIGRAM(S): at 20:43

## 2018-04-16 RX ADMIN — HALOPERIDOL DECANOATE 5 MILLIGRAM(S): 100 INJECTION INTRAMUSCULAR at 22:57

## 2018-04-16 RX ADMIN — Medication 1 PATCH: at 09:00

## 2018-04-16 RX ADMIN — Medication 2 MILLIGRAM(S): at 11:00

## 2018-04-16 RX ADMIN — OLANZAPINE 15 MILLIGRAM(S): 15 TABLET, FILM COATED ORAL at 11:00

## 2018-04-16 RX ADMIN — OLANZAPINE 15 MILLIGRAM(S): 15 TABLET, FILM COATED ORAL at 20:43

## 2018-04-16 RX ADMIN — LITHIUM CARBONATE 300 MILLIGRAM(S): 300 TABLET, EXTENDED RELEASE ORAL at 11:00

## 2018-04-16 RX ADMIN — Medication 50 MILLIGRAM(S): at 22:57

## 2018-04-17 LAB
CHOLEST SERPL-MCNC: 165 MG/DL — SIGNIFICANT CHANGE UP (ref 120–199)
HBA1C BLD-MCNC: 5.5 % — SIGNIFICANT CHANGE UP (ref 4–5.6)
HDLC SERPL-MCNC: 49 MG/DL — SIGNIFICANT CHANGE UP (ref 45–65)
LIPID PNL WITH DIRECT LDL SERPL: 100 MG/DL — SIGNIFICANT CHANGE UP
T3 SERPL-MCNC: 106.6 NG/DL — SIGNIFICANT CHANGE UP (ref 80–200)
T4 AB SER-ACNC: 4.51 UG/DL — LOW (ref 5.1–13)
TRIGL SERPL-MCNC: 129 MG/DL — SIGNIFICANT CHANGE UP (ref 10–149)
TSH SERPL-MCNC: 7.38 UIU/ML — HIGH (ref 0.27–4.2)

## 2018-04-17 PROCEDURE — 99232 SBSQ HOSP IP/OBS MODERATE 35: CPT

## 2018-04-17 RX ORDER — LEVOTHYROXINE SODIUM 125 MCG
50 TABLET ORAL DAILY
Qty: 0 | Refills: 0 | Status: DISCONTINUED | OUTPATIENT
Start: 2018-04-18 | End: 2018-05-03

## 2018-04-17 RX ADMIN — OLANZAPINE 15 MILLIGRAM(S): 15 TABLET, FILM COATED ORAL at 08:32

## 2018-04-17 RX ADMIN — HALOPERIDOL DECANOATE 5 MILLIGRAM(S): 100 INJECTION INTRAMUSCULAR at 20:35

## 2018-04-17 RX ADMIN — Medication 1 PATCH: at 08:32

## 2018-04-17 RX ADMIN — OLANZAPINE 15 MILLIGRAM(S): 15 TABLET, FILM COATED ORAL at 20:36

## 2018-04-17 RX ADMIN — Medication 650 MILLIGRAM(S): at 22:41

## 2018-04-17 RX ADMIN — DIVALPROEX SODIUM 500 MILLIGRAM(S): 500 TABLET, DELAYED RELEASE ORAL at 08:32

## 2018-04-17 RX ADMIN — Medication 2 MILLIGRAM(S): at 08:32

## 2018-04-17 RX ADMIN — DIVALPROEX SODIUM 500 MILLIGRAM(S): 500 TABLET, DELAYED RELEASE ORAL at 20:35

## 2018-04-17 RX ADMIN — Medication 650 MILLIGRAM(S): at 10:00

## 2018-04-17 RX ADMIN — LITHIUM CARBONATE 300 MILLIGRAM(S): 300 TABLET, EXTENDED RELEASE ORAL at 08:32

## 2018-04-17 RX ADMIN — Medication 2 MILLIGRAM(S): at 20:35

## 2018-04-17 RX ADMIN — Medication 50 MILLIGRAM(S): at 20:36

## 2018-04-17 RX ADMIN — BENZOCAINE AND MENTHOL 1 LOZENGE: 5; 1 LIQUID ORAL at 23:20

## 2018-04-18 PROCEDURE — 99232 SBSQ HOSP IP/OBS MODERATE 35: CPT

## 2018-04-18 RX ORDER — LANOLIN ALCOHOL/MO/W.PET/CERES
3 CREAM (GRAM) TOPICAL ONCE
Qty: 0 | Refills: 0 | Status: COMPLETED | OUTPATIENT
Start: 2018-04-18 | End: 2018-04-18

## 2018-04-18 RX ADMIN — Medication 650 MILLIGRAM(S): at 21:54

## 2018-04-18 RX ADMIN — BENZOCAINE AND MENTHOL 1 LOZENGE: 5; 1 LIQUID ORAL at 18:26

## 2018-04-18 RX ADMIN — Medication 3 MILLIGRAM(S): at 22:47

## 2018-04-18 RX ADMIN — Medication 650 MILLIGRAM(S): at 22:54

## 2018-04-18 RX ADMIN — OLANZAPINE 15 MILLIGRAM(S): 15 TABLET, FILM COATED ORAL at 20:37

## 2018-04-18 RX ADMIN — OLANZAPINE 15 MILLIGRAM(S): 15 TABLET, FILM COATED ORAL at 09:30

## 2018-04-18 RX ADMIN — DIVALPROEX SODIUM 500 MILLIGRAM(S): 500 TABLET, DELAYED RELEASE ORAL at 20:37

## 2018-04-18 RX ADMIN — DIVALPROEX SODIUM 500 MILLIGRAM(S): 500 TABLET, DELAYED RELEASE ORAL at 09:30

## 2018-04-18 RX ADMIN — Medication 50 MILLIGRAM(S): at 20:37

## 2018-04-18 RX ADMIN — Medication 50 MICROGRAM(S): at 09:30

## 2018-04-18 RX ADMIN — BENZOCAINE AND MENTHOL 1 LOZENGE: 5; 1 LIQUID ORAL at 12:03

## 2018-04-18 RX ADMIN — Medication 650 MILLIGRAM(S): at 00:30

## 2018-04-18 RX ADMIN — Medication 1 PATCH: at 12:02

## 2018-04-18 RX ADMIN — Medication 2 MILLIGRAM(S): at 20:37

## 2018-04-18 RX ADMIN — Medication 2 MILLIGRAM(S): at 09:30

## 2018-04-19 PROCEDURE — 99232 SBSQ HOSP IP/OBS MODERATE 35: CPT

## 2018-04-19 RX ORDER — CLONAZEPAM 1 MG
2 TABLET ORAL AT BEDTIME
Qty: 0 | Refills: 0 | Status: DISCONTINUED | OUTPATIENT
Start: 2018-04-19 | End: 2018-04-23

## 2018-04-19 RX ORDER — DIVALPROEX SODIUM 500 MG/1
500 TABLET, DELAYED RELEASE ORAL AT BEDTIME
Qty: 0 | Refills: 0 | Status: COMPLETED | OUTPATIENT
Start: 2018-04-19 | End: 2018-04-19

## 2018-04-19 RX ORDER — OLANZAPINE 15 MG/1
15 TABLET, FILM COATED ORAL AT BEDTIME
Qty: 0 | Refills: 0 | Status: COMPLETED | OUTPATIENT
Start: 2018-04-19 | End: 2018-04-19

## 2018-04-19 RX ORDER — DIVALPROEX SODIUM 500 MG/1
1000 TABLET, DELAYED RELEASE ORAL AT BEDTIME
Qty: 0 | Refills: 0 | Status: DISCONTINUED | OUTPATIENT
Start: 2018-04-19 | End: 2018-05-03

## 2018-04-19 RX ORDER — OLANZAPINE 15 MG/1
30 TABLET, FILM COATED ORAL AT BEDTIME
Qty: 0 | Refills: 0 | Status: DISCONTINUED | OUTPATIENT
Start: 2018-04-20 | End: 2018-05-03

## 2018-04-19 RX ADMIN — DIVALPROEX SODIUM 1000 MILLIGRAM(S): 500 TABLET, DELAYED RELEASE ORAL at 21:08

## 2018-04-19 RX ADMIN — Medication 50 MILLIGRAM(S): at 21:08

## 2018-04-19 RX ADMIN — DIVALPROEX SODIUM 500 MILLIGRAM(S): 500 TABLET, DELAYED RELEASE ORAL at 21:08

## 2018-04-19 RX ADMIN — Medication 1 PATCH: at 21:26

## 2018-04-19 RX ADMIN — OLANZAPINE 15 MILLIGRAM(S): 15 TABLET, FILM COATED ORAL at 08:08

## 2018-04-19 RX ADMIN — Medication 2 MILLIGRAM(S): at 08:08

## 2018-04-19 RX ADMIN — Medication 650 MILLIGRAM(S): at 16:29

## 2018-04-19 RX ADMIN — Medication 50 MICROGRAM(S): at 08:08

## 2018-04-19 RX ADMIN — DIVALPROEX SODIUM 500 MILLIGRAM(S): 500 TABLET, DELAYED RELEASE ORAL at 08:08

## 2018-04-19 RX ADMIN — Medication 1 PATCH: at 08:08

## 2018-04-19 RX ADMIN — OLANZAPINE 15 MILLIGRAM(S): 15 TABLET, FILM COATED ORAL at 21:08

## 2018-04-19 RX ADMIN — HALOPERIDOL DECANOATE 5 MILLIGRAM(S): 100 INJECTION INTRAMUSCULAR at 21:09

## 2018-04-19 RX ADMIN — Medication 650 MILLIGRAM(S): at 21:26

## 2018-04-19 RX ADMIN — Medication 2 MILLIGRAM(S): at 21:08

## 2018-04-19 RX ADMIN — Medication 650 MILLIGRAM(S): at 21:08

## 2018-04-20 PROCEDURE — 99232 SBSQ HOSP IP/OBS MODERATE 35: CPT

## 2018-04-20 RX ORDER — SENNA PLUS 8.6 MG/1
2 TABLET ORAL AT BEDTIME
Qty: 0 | Refills: 0 | Status: DISCONTINUED | OUTPATIENT
Start: 2018-04-20 | End: 2018-05-03

## 2018-04-20 RX ADMIN — OLANZAPINE 30 MILLIGRAM(S): 15 TABLET, FILM COATED ORAL at 21:39

## 2018-04-20 RX ADMIN — Medication 1 PATCH: at 09:45

## 2018-04-20 RX ADMIN — DIVALPROEX SODIUM 1000 MILLIGRAM(S): 500 TABLET, DELAYED RELEASE ORAL at 21:39

## 2018-04-20 RX ADMIN — SENNA PLUS 2 TABLET(S): 8.6 TABLET ORAL at 21:39

## 2018-04-20 RX ADMIN — Medication 50 MICROGRAM(S): at 09:45

## 2018-04-20 RX ADMIN — Medication 2 MILLIGRAM(S): at 21:39

## 2018-04-21 RX ADMIN — Medication 650 MILLIGRAM(S): at 09:30

## 2018-04-21 RX ADMIN — Medication 2 MILLIGRAM(S): at 20:30

## 2018-04-21 RX ADMIN — BENZOCAINE AND MENTHOL 1 LOZENGE: 5; 1 LIQUID ORAL at 18:17

## 2018-04-21 RX ADMIN — BENZOCAINE AND MENTHOL 1 LOZENGE: 5; 1 LIQUID ORAL at 12:02

## 2018-04-21 RX ADMIN — DIVALPROEX SODIUM 1000 MILLIGRAM(S): 500 TABLET, DELAYED RELEASE ORAL at 20:30

## 2018-04-21 RX ADMIN — Medication 650 MILLIGRAM(S): at 10:35

## 2018-04-21 RX ADMIN — Medication 50 MICROGRAM(S): at 08:28

## 2018-04-21 RX ADMIN — OLANZAPINE 30 MILLIGRAM(S): 15 TABLET, FILM COATED ORAL at 20:30

## 2018-04-21 RX ADMIN — SENNA PLUS 2 TABLET(S): 8.6 TABLET ORAL at 20:30

## 2018-04-21 RX ADMIN — Medication 1 PATCH: at 08:28

## 2018-04-22 RX ADMIN — SENNA PLUS 2 TABLET(S): 8.6 TABLET ORAL at 21:09

## 2018-04-22 RX ADMIN — Medication 50 MICROGRAM(S): at 08:54

## 2018-04-22 RX ADMIN — OLANZAPINE 30 MILLIGRAM(S): 15 TABLET, FILM COATED ORAL at 20:35

## 2018-04-22 RX ADMIN — DIVALPROEX SODIUM 1000 MILLIGRAM(S): 500 TABLET, DELAYED RELEASE ORAL at 20:35

## 2018-04-22 RX ADMIN — Medication 1 PATCH: at 08:54

## 2018-04-22 RX ADMIN — Medication 2 MILLIGRAM(S): at 20:35

## 2018-04-22 RX ADMIN — Medication 50 MILLIGRAM(S): at 20:35

## 2018-04-22 RX ADMIN — BENZOCAINE AND MENTHOL 1 LOZENGE: 5; 1 LIQUID ORAL at 10:13

## 2018-04-23 PROCEDURE — 99232 SBSQ HOSP IP/OBS MODERATE 35: CPT | Mod: 25

## 2018-04-23 PROCEDURE — 90853 GROUP PSYCHOTHERAPY: CPT

## 2018-04-23 RX ORDER — CLONAZEPAM 1 MG
1 TABLET ORAL AT BEDTIME
Qty: 0 | Refills: 0 | Status: DISCONTINUED | OUTPATIENT
Start: 2018-04-23 | End: 2018-04-24

## 2018-04-23 RX ORDER — SODIUM CHLORIDE 0.65 %
1 AEROSOL, SPRAY (ML) NASAL THREE TIMES A DAY
Qty: 0 | Refills: 0 | Status: DISCONTINUED | OUTPATIENT
Start: 2018-04-23 | End: 2018-05-03

## 2018-04-23 RX ADMIN — BENZOCAINE AND MENTHOL 1 LOZENGE: 5; 1 LIQUID ORAL at 11:24

## 2018-04-23 RX ADMIN — OLANZAPINE 30 MILLIGRAM(S): 15 TABLET, FILM COATED ORAL at 20:24

## 2018-04-23 RX ADMIN — Medication 1 MILLIGRAM(S): at 20:24

## 2018-04-23 RX ADMIN — Medication 50 MICROGRAM(S): at 10:27

## 2018-04-23 RX ADMIN — DIVALPROEX SODIUM 1000 MILLIGRAM(S): 500 TABLET, DELAYED RELEASE ORAL at 20:24

## 2018-04-23 RX ADMIN — Medication 1 PATCH: at 09:28

## 2018-04-23 RX ADMIN — Medication 50 MILLIGRAM(S): at 18:30

## 2018-04-23 RX ADMIN — Medication 1 PATCH: at 07:49

## 2018-04-23 RX ADMIN — SENNA PLUS 2 TABLET(S): 8.6 TABLET ORAL at 22:24

## 2018-04-24 PROCEDURE — 99232 SBSQ HOSP IP/OBS MODERATE 35: CPT

## 2018-04-24 RX ORDER — TUBERCULIN PURIFIED PROTEIN DERIVATIVE 5 [IU]/.1ML
5 INJECTION, SOLUTION INTRADERMAL ONCE
Qty: 0 | Refills: 0 | Status: COMPLETED | OUTPATIENT
Start: 2018-04-24 | End: 2018-04-24

## 2018-04-24 RX ADMIN — Medication 400 MILLIGRAM(S): at 17:45

## 2018-04-24 RX ADMIN — SENNA PLUS 2 TABLET(S): 8.6 TABLET ORAL at 20:45

## 2018-04-24 RX ADMIN — Medication 650 MILLIGRAM(S): at 10:48

## 2018-04-24 RX ADMIN — Medication 650 MILLIGRAM(S): at 09:48

## 2018-04-24 RX ADMIN — Medication 1 PATCH: at 08:44

## 2018-04-24 RX ADMIN — Medication 50 MICROGRAM(S): at 08:44

## 2018-04-24 RX ADMIN — BENZOCAINE AND MENTHOL 1 LOZENGE: 5; 1 LIQUID ORAL at 20:45

## 2018-04-24 RX ADMIN — Medication 1 MILLIGRAM(S): at 20:44

## 2018-04-24 RX ADMIN — DIVALPROEX SODIUM 1000 MILLIGRAM(S): 500 TABLET, DELAYED RELEASE ORAL at 20:44

## 2018-04-24 RX ADMIN — OLANZAPINE 30 MILLIGRAM(S): 15 TABLET, FILM COATED ORAL at 20:45

## 2018-04-24 RX ADMIN — TUBERCULIN PURIFIED PROTEIN DERIVATIVE 5 UNIT(S): 5 INJECTION, SOLUTION INTRADERMAL at 18:29

## 2018-04-24 RX ADMIN — Medication 50 MILLIGRAM(S): at 01:12

## 2018-04-25 PROCEDURE — 99232 SBSQ HOSP IP/OBS MODERATE 35: CPT

## 2018-04-25 RX ORDER — CELECOXIB 200 MG/1
200 CAPSULE ORAL ONCE
Qty: 0 | Refills: 0 | Status: COMPLETED | OUTPATIENT
Start: 2018-04-25 | End: 2018-04-25

## 2018-04-25 RX ADMIN — Medication 650 MILLIGRAM(S): at 15:36

## 2018-04-25 RX ADMIN — Medication 1 PATCH: at 08:13

## 2018-04-25 RX ADMIN — OLANZAPINE 30 MILLIGRAM(S): 15 TABLET, FILM COATED ORAL at 21:46

## 2018-04-25 RX ADMIN — CELECOXIB 200 MILLIGRAM(S): 200 CAPSULE ORAL at 17:29

## 2018-04-25 RX ADMIN — CELECOXIB 200 MILLIGRAM(S): 200 CAPSULE ORAL at 16:06

## 2018-04-25 RX ADMIN — DIVALPROEX SODIUM 1000 MILLIGRAM(S): 500 TABLET, DELAYED RELEASE ORAL at 21:46

## 2018-04-25 RX ADMIN — Medication 50 MICROGRAM(S): at 08:13

## 2018-04-25 RX ADMIN — Medication 650 MILLIGRAM(S): at 13:00

## 2018-04-26 PROCEDURE — 99232 SBSQ HOSP IP/OBS MODERATE 35: CPT

## 2018-04-26 RX ORDER — CELECOXIB 200 MG/1
100 CAPSULE ORAL
Qty: 0 | Refills: 0 | Status: DISCONTINUED | OUTPATIENT
Start: 2018-04-26 | End: 2018-05-03

## 2018-04-26 RX ADMIN — Medication 1 PATCH: at 08:52

## 2018-04-26 RX ADMIN — Medication 50 MICROGRAM(S): at 08:52

## 2018-04-26 RX ADMIN — OLANZAPINE 30 MILLIGRAM(S): 15 TABLET, FILM COATED ORAL at 21:45

## 2018-04-26 RX ADMIN — TUBERCULIN PURIFIED PROTEIN DERIVATIVE 5 UNIT(S): 5 INJECTION, SOLUTION INTRADERMAL at 12:23

## 2018-04-26 RX ADMIN — DIVALPROEX SODIUM 1000 MILLIGRAM(S): 500 TABLET, DELAYED RELEASE ORAL at 21:45

## 2018-04-26 RX ADMIN — CELECOXIB 100 MILLIGRAM(S): 200 CAPSULE ORAL at 21:45

## 2018-04-26 RX ADMIN — CELECOXIB 100 MILLIGRAM(S): 200 CAPSULE ORAL at 22:45

## 2018-04-27 PROCEDURE — 99232 SBSQ HOSP IP/OBS MODERATE 35: CPT

## 2018-04-27 RX ORDER — DIPHENHYDRAMINE HCL 50 MG
25 CAPSULE ORAL ONCE
Qty: 0 | Refills: 0 | Status: COMPLETED | OUTPATIENT
Start: 2018-04-27 | End: 2018-04-27

## 2018-04-27 RX ADMIN — DIVALPROEX SODIUM 1000 MILLIGRAM(S): 500 TABLET, DELAYED RELEASE ORAL at 21:31

## 2018-04-27 RX ADMIN — Medication 1 PATCH: at 09:22

## 2018-04-27 RX ADMIN — CELECOXIB 100 MILLIGRAM(S): 200 CAPSULE ORAL at 22:30

## 2018-04-27 RX ADMIN — BENZOCAINE AND MENTHOL 1 LOZENGE: 5; 1 LIQUID ORAL at 20:21

## 2018-04-27 RX ADMIN — Medication 50 MICROGRAM(S): at 09:18

## 2018-04-27 RX ADMIN — CELECOXIB 100 MILLIGRAM(S): 200 CAPSULE ORAL at 21:31

## 2018-04-27 RX ADMIN — CELECOXIB 100 MILLIGRAM(S): 200 CAPSULE ORAL at 09:22

## 2018-04-27 RX ADMIN — OLANZAPINE 30 MILLIGRAM(S): 15 TABLET, FILM COATED ORAL at 21:31

## 2018-04-27 RX ADMIN — Medication 25 MILLIGRAM(S): at 23:28

## 2018-04-27 RX ADMIN — BENZOCAINE AND MENTHOL 1 LOZENGE: 5; 1 LIQUID ORAL at 12:47

## 2018-04-27 RX ADMIN — CELECOXIB 100 MILLIGRAM(S): 200 CAPSULE ORAL at 10:22

## 2018-04-27 RX ADMIN — Medication 50 MILLIGRAM(S): at 00:08

## 2018-04-27 RX ADMIN — Medication 50 MILLIGRAM(S): at 21:35

## 2018-04-28 RX ADMIN — DIVALPROEX SODIUM 1000 MILLIGRAM(S): 500 TABLET, DELAYED RELEASE ORAL at 21:53

## 2018-04-28 RX ADMIN — SENNA PLUS 2 TABLET(S): 8.6 TABLET ORAL at 21:53

## 2018-04-28 RX ADMIN — OLANZAPINE 30 MILLIGRAM(S): 15 TABLET, FILM COATED ORAL at 21:53

## 2018-04-28 RX ADMIN — CELECOXIB 100 MILLIGRAM(S): 200 CAPSULE ORAL at 21:53

## 2018-04-28 RX ADMIN — CELECOXIB 100 MILLIGRAM(S): 200 CAPSULE ORAL at 22:24

## 2018-04-28 RX ADMIN — Medication 50 MILLIGRAM(S): at 22:50

## 2018-04-29 RX ADMIN — CELECOXIB 100 MILLIGRAM(S): 200 CAPSULE ORAL at 08:33

## 2018-04-29 RX ADMIN — OLANZAPINE 30 MILLIGRAM(S): 15 TABLET, FILM COATED ORAL at 21:44

## 2018-04-29 RX ADMIN — Medication 1 PATCH: at 08:34

## 2018-04-29 RX ADMIN — Medication 50 MILLIGRAM(S): at 22:05

## 2018-04-29 RX ADMIN — Medication 50 MICROGRAM(S): at 08:33

## 2018-04-29 RX ADMIN — CELECOXIB 100 MILLIGRAM(S): 200 CAPSULE ORAL at 21:44

## 2018-04-29 RX ADMIN — DIVALPROEX SODIUM 1000 MILLIGRAM(S): 500 TABLET, DELAYED RELEASE ORAL at 21:44

## 2018-04-30 PROCEDURE — 99232 SBSQ HOSP IP/OBS MODERATE 35: CPT

## 2018-04-30 RX ORDER — LEVOTHYROXINE SODIUM 125 MCG
1 TABLET ORAL
Qty: 15 | Refills: 0 | OUTPATIENT
Start: 2018-04-30 | End: 2018-05-14

## 2018-04-30 RX ORDER — CELECOXIB 200 MG/1
1 CAPSULE ORAL
Qty: 30 | Refills: 0 | OUTPATIENT
Start: 2018-04-30 | End: 2018-05-14

## 2018-04-30 RX ORDER — DIVALPROEX SODIUM 500 MG/1
2 TABLET, DELAYED RELEASE ORAL
Qty: 30 | Refills: 0 | OUTPATIENT
Start: 2018-04-30 | End: 2018-05-14

## 2018-04-30 RX ORDER — OLANZAPINE 15 MG/1
2 TABLET, FILM COATED ORAL
Qty: 30 | Refills: 0 | OUTPATIENT
Start: 2018-04-30 | End: 2018-05-14

## 2018-04-30 RX ORDER — SENNA PLUS 8.6 MG/1
2 TABLET ORAL
Qty: 30 | Refills: 0 | OUTPATIENT
Start: 2018-04-30 | End: 2018-05-14

## 2018-04-30 RX ADMIN — CELECOXIB 100 MILLIGRAM(S): 200 CAPSULE ORAL at 09:05

## 2018-04-30 RX ADMIN — Medication 1 PATCH: at 09:06

## 2018-04-30 RX ADMIN — CELECOXIB 100 MILLIGRAM(S): 200 CAPSULE ORAL at 20:25

## 2018-04-30 RX ADMIN — Medication 50 MICROGRAM(S): at 09:06

## 2018-04-30 RX ADMIN — DIVALPROEX SODIUM 1000 MILLIGRAM(S): 500 TABLET, DELAYED RELEASE ORAL at 20:24

## 2018-04-30 RX ADMIN — CELECOXIB 100 MILLIGRAM(S): 200 CAPSULE ORAL at 20:24

## 2018-04-30 RX ADMIN — CELECOXIB 100 MILLIGRAM(S): 200 CAPSULE ORAL at 08:40

## 2018-04-30 RX ADMIN — OLANZAPINE 30 MILLIGRAM(S): 15 TABLET, FILM COATED ORAL at 20:25

## 2018-05-01 LAB — VALPROATE SERPL-MCNC: 61.7 UG/ML — SIGNIFICANT CHANGE UP (ref 50–100)

## 2018-05-01 RX ADMIN — DIVALPROEX SODIUM 1000 MILLIGRAM(S): 500 TABLET, DELAYED RELEASE ORAL at 21:49

## 2018-05-01 RX ADMIN — CELECOXIB 100 MILLIGRAM(S): 200 CAPSULE ORAL at 09:03

## 2018-05-01 RX ADMIN — Medication 50 MICROGRAM(S): at 09:03

## 2018-05-01 RX ADMIN — OLANZAPINE 30 MILLIGRAM(S): 15 TABLET, FILM COATED ORAL at 21:49

## 2018-05-01 RX ADMIN — Medication 50 MILLIGRAM(S): at 02:01

## 2018-05-02 PROCEDURE — 99232 SBSQ HOSP IP/OBS MODERATE 35: CPT | Mod: 25

## 2018-05-02 RX ORDER — NICOTINE POLACRILEX 2 MG
4 GUM BUCCAL EVERY 4 HOURS
Qty: 0 | Refills: 0 | Status: DISCONTINUED | OUTPATIENT
Start: 2018-05-02 | End: 2018-05-03

## 2018-05-02 RX ADMIN — CELECOXIB 100 MILLIGRAM(S): 200 CAPSULE ORAL at 20:57

## 2018-05-02 RX ADMIN — CELECOXIB 100 MILLIGRAM(S): 200 CAPSULE ORAL at 01:13

## 2018-05-02 RX ADMIN — CELECOXIB 100 MILLIGRAM(S): 200 CAPSULE ORAL at 08:31

## 2018-05-02 RX ADMIN — OLANZAPINE 30 MILLIGRAM(S): 15 TABLET, FILM COATED ORAL at 20:57

## 2018-05-02 RX ADMIN — DIVALPROEX SODIUM 1000 MILLIGRAM(S): 500 TABLET, DELAYED RELEASE ORAL at 20:57

## 2018-05-02 RX ADMIN — Medication 50 MICROGRAM(S): at 08:31

## 2018-05-02 RX ADMIN — CELECOXIB 100 MILLIGRAM(S): 200 CAPSULE ORAL at 21:57

## 2018-05-03 VITALS — TEMPERATURE: 99 F

## 2018-05-03 PROCEDURE — 99238 HOSP IP/OBS DSCHRG MGMT 30/<: CPT | Mod: 25

## 2018-05-03 RX ADMIN — Medication 1 PATCH: at 08:12

## 2018-05-03 RX ADMIN — CELECOXIB 100 MILLIGRAM(S): 200 CAPSULE ORAL at 08:11

## 2018-05-03 RX ADMIN — CELECOXIB 100 MILLIGRAM(S): 200 CAPSULE ORAL at 09:00

## 2018-05-03 RX ADMIN — Medication 50 MICROGRAM(S): at 08:12

## 2019-09-01 ENCOUNTER — OUTPATIENT (OUTPATIENT)
Dept: OUTPATIENT SERVICES | Facility: HOSPITAL | Age: 49
LOS: 1 days | End: 2019-09-01
Payer: MEDICARE

## 2019-09-23 DIAGNOSIS — Z71.89 OTHER SPECIFIED COUNSELING: ICD-10-CM

## 2019-09-23 PROBLEM — F20.9 SCHIZOPHRENIA, UNSPECIFIED: Chronic | Status: ACTIVE | Noted: 2018-03-31

## 2019-09-24 DIAGNOSIS — Z76.89 PERSONS ENCOUNTERING HEALTH SERVICES IN OTHER SPECIFIED CIRCUMSTANCES: ICD-10-CM

## 2020-01-01 PROCEDURE — G9005: CPT

## 2020-02-25 ENCOUNTER — EMERGENCY (EMERGENCY)
Facility: HOSPITAL | Age: 50
LOS: 0 days | Discharge: HOME | End: 2020-02-25
Attending: EMERGENCY MEDICINE
Payer: MEDICARE

## 2020-02-25 VITALS
SYSTOLIC BLOOD PRESSURE: 140 MMHG | HEART RATE: 95 BPM | DIASTOLIC BLOOD PRESSURE: 87 MMHG | RESPIRATION RATE: 18 BRPM | TEMPERATURE: 97 F | OXYGEN SATURATION: 99 %

## 2020-02-25 VITALS — WEIGHT: 160.06 LBS

## 2020-02-25 DIAGNOSIS — F17.200 NICOTINE DEPENDENCE, UNSPECIFIED, UNCOMPLICATED: ICD-10-CM

## 2020-02-25 DIAGNOSIS — M79.645 PAIN IN LEFT FINGER(S): ICD-10-CM

## 2020-02-25 DIAGNOSIS — F20.9 SCHIZOPHRENIA, UNSPECIFIED: ICD-10-CM

## 2020-02-25 PROCEDURE — 99282 EMERGENCY DEPT VISIT SF MDM: CPT

## 2020-02-25 NOTE — ED STATDOCS - NS ED ROS FT
Constitutional: No fever or chills  Eyes: No visual changes  HEENT: No throat pain  CV: No chest pain  Resp: No SOB no cough  GI: No abd pain, nausea or vomiting  : No dysuria  MSK: +left thumb pain   Skin: No rash  Neuro: No headache

## 2020-02-25 NOTE — ED STATDOCS - PROGRESS NOTE DETAILS
50 y/o Female presents to the ED c/o intermittent pains to Left 1st webspace between thumb and 2nd finger.  Denies new trauma, injury.  no swelling, redness, cut to area.  Neg joint pain, swelling, fevers.  On exam, neg deformity to left hand.  NT thumb, 2nd finger to palp.  Full AROM fingers.  NVI.  Will dc home.   Pt reports having Ortho in [place] where she lives that she will be seeing for Chronic right leg issues.  Recommend having that Ortho re-eval left thumb.  Edilma Romero PA-C

## 2020-02-25 NOTE — ED STATDOCS - PHYSICAL EXAMINATION
Constitutional: mild distress AAOx3  Eyes: PERRLA EOMI  Head: Normocephalic atraumatic  Mouth: MMM  Cardiac: regular rate   Resp: Lungs CTAB  GI: Abd s/nt/nd  Neuro: CN2-12 intact  Skin: No rashes  MSK: left thumb Normal strength, sensation. No redness or swelling, no TTP. Full ROM. Normal pulses. Constitutional: NAD AAOx3  Eyes: PERRLA EOMI  Head: Normocephalic atraumatic  Mouth: MMM  Cardiac: regular rate   Resp: Lungs CTAB  GI: Abd s/nt/nd  Neuro: CN2-12 intact  Skin: No rashes  MSK: left thumb Normal strength, sensation. No redness or swelling, no TTP. Full ROM. Normal pulses.

## 2020-02-25 NOTE — ED STATDOCS - NS_ ATTENDINGSCRIBEDETAILS _ED_A_ED_FT
I, Yoel Grey MD,  performed the initial face to face bedside interview with this patient regarding history of present illness, review of symptoms and relevant past medical, social and family history.  I completed an independent physical examination.  I was the initial provider who evaluated this patient.  The history, relevant review of systems, past medical and surgical history, medical decision making, and physical examination was documented by the scribe in my presence and I attest to the accuracy of the documentation.

## 2020-02-25 NOTE — ED STATDOCS - CLINICAL SUMMARY MEDICAL DECISION MAKING FREE TEXT BOX
49F presents to ED with left thumb discomfort on and off for a few years. States she was coming to  medical records for workers comp and decided to get evaluation while she was here. no signs of infection or significant injury. Counseled pt on need for follow up with ortho.

## 2020-02-25 NOTE — ED STATDOCS - OBJECTIVE STATEMENT
50 y/o female with a PMHx of schizophrenia presents to the ED c/o intermittent left thumb pain for 2 months, described as dull and achy, 4/10 in severity. Pt reports she had an injury to left thumb a few years ago in which she cut her thumb with a knife, had 4 stitches. Now for past 2 months is experiencing pain in same area of initial injury. Pain is worse with palpation. Has not had new trauma or injury to thumb. Has not followed up with ortho or ortho hand. Did not have x-ray done. +smoker. Denies EtOH use, illicit drug use. NKDA. 48 y/o female with a PMHx of schizophrenia presents to the ED c/o intermittent left thumb pain for 2 months, described as dull and achy, 4/10 in severity. Pt reports she had an injury to left thumb a few years ago in which she cut her thumb with a knife, had 4 stitches. Now for past 2 months is experiencing pain in same area of initial injury.  Has not had new trauma or injury to thumb. Has not followed up with ortho or ortho hand. Did not have x-ray done. +smoker. Denies EtOH use, illicit drug use. NKDA.

## 2020-02-25 NOTE — ED STATDOCS - CARE PROVIDER_API CALL
Zarha Clarke)  Orthopaedic Surgery  [address] [place] [address]  Phone: [phone]  Fax: [phone]  Follow Up Time:

## 2020-02-25 NOTE — ED STATDOCS - PATIENT PORTAL LINK FT
You can access the FollowMyHealth Patient Portal offered by [facility] by registering at the following website: [email or web address]. By joining [employer]’s FollowMyHealth portal, you will also be able to view your health information using other applications (apps) compatible with our system.

## 2020-02-25 NOTE — ED ADULT TRIAGE NOTE - CHIEF COMPLAINT QUOTE
pt c/o left thumb pain for over 1 year, worsening the past few days. pt states she injured the thumb in 2014 and believes it did not heal  properly pt denies fall trauma fever or reinjury pt states she did not see any other doctorss for this issue

## 2020-09-09 ENCOUNTER — EMERGENCY (EMERGENCY)
Facility: HOSPITAL | Age: 50
LOS: 0 days | Discharge: HOME | End: 2020-09-09
Attending: EMERGENCY MEDICINE
Payer: MEDICARE

## 2020-09-09 VITALS
SYSTOLIC BLOOD PRESSURE: 130 MMHG | RESPIRATION RATE: 18 BRPM | OXYGEN SATURATION: 99 % | DIASTOLIC BLOOD PRESSURE: 70 MMHG | TEMPERATURE: 99 F | HEART RATE: 93 BPM

## 2020-09-09 VITALS — WEIGHT: 160.06 LBS | HEIGHT: 71 IN

## 2020-09-09 DIAGNOSIS — Z79.890 HORMONE REPLACEMENT THERAPY: ICD-10-CM

## 2020-09-09 DIAGNOSIS — M79.675 PAIN IN LEFT TOE(S): ICD-10-CM

## 2020-09-09 DIAGNOSIS — M79.672 PAIN IN LEFT FOOT: ICD-10-CM

## 2020-09-09 DIAGNOSIS — Z79.818 LONG TERM (CURRENT) USE OF OTHER AGENTS AFFECTING ESTROGEN RECEPTORS AND ESTROGEN LEVELS: ICD-10-CM

## 2020-09-09 DIAGNOSIS — F20.9 SCHIZOPHRENIA, UNSPECIFIED: ICD-10-CM

## 2020-09-09 DIAGNOSIS — Z79.02 LONG TERM (CURRENT) USE OF ANTITHROMBOTICS/ANTIPLATELETS: ICD-10-CM

## 2020-09-09 DIAGNOSIS — Z79.899 OTHER LONG TERM (CURRENT) DRUG THERAPY: ICD-10-CM

## 2020-09-09 PROCEDURE — 99283 EMERGENCY DEPT VISIT LOW MDM: CPT

## 2020-09-09 PROCEDURE — 99283 EMERGENCY DEPT VISIT LOW MDM: CPT | Mod: 25

## 2020-09-09 PROCEDURE — 73630 X-RAY EXAM OF FOOT: CPT | Mod: LT

## 2020-09-09 PROCEDURE — 73630 X-RAY EXAM OF FOOT: CPT | Mod: 26,LT

## 2020-09-09 RX ORDER — IBUPROFEN 200 MG
1 TABLET ORAL
Qty: 6 | Refills: 0
Start: 2020-09-09 | End: 2020-09-11

## 2020-09-09 RX ORDER — IBUPROFEN 200 MG
600 TABLET ORAL ONCE
Refills: 0 | Status: COMPLETED | OUTPATIENT
Start: 2020-09-09 | End: 2020-09-09

## 2020-09-09 RX ADMIN — Medication 600 MILLIGRAM(S): at 16:00

## 2020-09-09 RX ADMIN — Medication 600 MILLIGRAM(S): at 15:30

## 2020-09-09 NOTE — ED ADULT NURSE NOTE - OBJECTIVE STATEMENT
patient axox3, c/o intermittent left foot pain x7 weeks. patient denies trauma or fall. patient denies headache, n/v/d, fever, chills, cough, chest pain, SOB.

## 2020-09-09 NOTE — ED PROCEDURE NOTE - CPROC ED POST PROC CARE GUIDE1
Verbal/written post procedure instructions were given to patient/caregiver./Keep the cast/splint/dressing clean and dry./Elevate the injured extremity as instructed./Instructed patient/caregiver to follow-up with primary care physician./Instructed patient/caregiver regarding signs and symptoms of infection.

## 2020-09-09 NOTE — ED STATDOCS - OBJECTIVE STATEMENT
48 y/o F with PMHx of schizophrenia presents to the ED c/o intermittent +L foot pain x7 weeks. No fever. Denies injury or trauma. NKDA.

## 2020-09-09 NOTE — ED STATDOCS - CARE PROVIDER_API CALL
Alecia Guardado  [facility]  [address] [place] [address]  Phone: [phone]  Fax: [phone]  Follow Up Time:

## 2020-09-09 NOTE — ED STATDOCS - PROGRESS NOTE DETAILS
pt reeval and offers no complaints.. pt admits her shoe is tight and thinks she is getting a bunion to her great toe on the left. pt advised to fu with podiatrist or orthopedic, use hard sole shoe as directed and return to ed for any worsening of symptoms. pt well appearing on discharge. -Mihaela Pereira PA-C

## 2020-09-09 NOTE — ED STATDOCS - PATIENT PORTAL LINK FT
You can access the FollowMyHealth Patient Portal offered by [facility] by registering at the following website: [email or web address]. By joining [employer]’s FollowMyHealth portal, you will also be able to view your health information using other applications (apps) compatible with our system.

## 2020-09-18 ENCOUNTER — APPOINTMENT (OUTPATIENT)
Dept: ORTHOPEDIC SURGERY | Facility: CLINIC | Age: 50
End: 2020-09-18
Payer: MEDICARE

## 2020-09-18 PROBLEM — Z00.00 ENCOUNTER FOR PREVENTIVE HEALTH EXAMINATION: Status: ACTIVE | Noted: 2020-09-18

## 2020-10-05 ENCOUNTER — EMERGENCY (EMERGENCY)
Facility: HOSPITAL | Age: 50
LOS: 0 days | Discharge: HOME | End: 2020-10-05
Attending: EMERGENCY MEDICINE
Payer: MEDICARE

## 2020-10-05 ENCOUNTER — APPOINTMENT (OUTPATIENT)
Dept: ORTHOPEDIC SURGERY | Facility: CLINIC | Age: 50
End: 2020-10-05
Payer: MEDICARE

## 2020-10-05 ENCOUNTER — RX RENEWAL (OUTPATIENT)
Age: 50
End: 2020-10-05

## 2020-10-05 VITALS
TEMPERATURE: 99 F | DIASTOLIC BLOOD PRESSURE: 89 MMHG | HEART RATE: 89 BPM | RESPIRATION RATE: 16 BRPM | OXYGEN SATURATION: 100 % | SYSTOLIC BLOOD PRESSURE: 132 MMHG

## 2020-10-05 VITALS
BODY MASS INDEX: 27.31 KG/M2 | HEIGHT: 64 IN | SYSTOLIC BLOOD PRESSURE: 127 MMHG | HEART RATE: 80 BPM | WEIGHT: 160 LBS | DIASTOLIC BLOOD PRESSURE: 85 MMHG

## 2020-10-05 VITALS — HEIGHT: 71 IN | WEIGHT: 160.06 LBS

## 2020-10-05 DIAGNOSIS — R68.84 JAW PAIN: ICD-10-CM

## 2020-10-05 DIAGNOSIS — Z91.010 ALLERGY TO PEANUTS: ICD-10-CM

## 2020-10-05 DIAGNOSIS — M54.2 CERVICALGIA: ICD-10-CM

## 2020-10-05 DIAGNOSIS — F20.9 SCHIZOPHRENIA, UNSPECIFIED: ICD-10-CM

## 2020-10-05 DIAGNOSIS — M79.672 PAIN IN LEFT FOOT: ICD-10-CM

## 2020-10-05 PROCEDURE — 99284 EMERGENCY DEPT VISIT MOD MDM: CPT | Mod: 25

## 2020-10-05 PROCEDURE — 70486 CT MAXILLOFACIAL W/O DYE: CPT | Mod: 26

## 2020-10-05 PROCEDURE — 99204 OFFICE O/P NEW MOD 45 MIN: CPT

## 2020-10-05 PROCEDURE — 99283 EMERGENCY DEPT VISIT LOW MDM: CPT

## 2020-10-05 PROCEDURE — 70486 CT MAXILLOFACIAL W/O DYE: CPT

## 2020-10-05 NOTE — ED STATDOCS - PROGRESS NOTE DETAILS
50 y/o F with PMH of schizophrenia presents with jaw, neck and sinus pain described as pressure for years. States symptoms are worse when weather changes. Admits she had history of bone graft in her jaw and nasal 48 y/o F with PMH of schizophrenia presents with jaw, neck and sinus pain described as pressure for years. States symptoms are worse when weather changes. Admits she had history of bone graft in her jaw and nasal fractures. Denies fever, chills, nausea, vomiting, blurry vision, dizziness. PE: Well appearing. HEENT: NC/AT. PERRLA, EOMI. Nares patent. No oropharyngeal edema, tonsilar enlargement/exudates, uvular deviation. Cardiac: s1s2, RRR. Lungs: CTAB. A/P: R/o fracture, infection. Plan for CT max/face, reassess. - Han Diaz PA-C

## 2020-10-05 NOTE — HISTORY OF PRESENT ILLNESS
[FreeTextEntry1] : 49 year old female presenting with left foot pain. The patient’s pain is noted to be a 5/10. The patient cannot attribute their pain to any specific injury, fall, or trauma. The patient notes her pain is located on the top of the foot. The patient went to [facility] and had x-rays of the left foot. The patient notes she has a history of plantar fasciitis.  She is currently taking Tylenol as needed. No other complaints at this time.

## 2020-10-05 NOTE — CONSULT LETTER
[Consult Letter:] : I had the pleasure of evaluating your patient, [unfilled]. [Please see my note below.] : Please see my note below. [Consult Closing:] : Thank you very much for allowing me to participate in the care of this patient.  If you have any questions, please do not hesitate to contact me. [Sincerely,] : Sincerely, [FreeTextEntry3] : Fredrick Hood, DO\par Foot and Ankle Surgery\par

## 2020-10-05 NOTE — ADDENDUM
[FreeTextEntry1] : I, Mac Atkins, acted solely as a scribe for Dr. Fredrick Hood on this date 10/05/2020 .\par All medical record entries made by the Scribe were at my, Dr. Fredrick Hood, direction and personally dictated by me on 10/05/2020 . I have reviewed the chart and agree that the record accurately reflects my personal performance of the history, physical exam, assessment and plan. I have also personally directed, reviewed, and agreed with the chart.

## 2020-10-05 NOTE — ED STATDOCS - PATIENT PORTAL LINK FT
You can access the FollowMyHealth Patient Portal offered by [facility] by registering at the following website: [email or web address]. By joining [employer]’s FollowMyHealth portal, you will also be able to view your health information using other applications (apps) compatible with our system.

## 2020-10-05 NOTE — DISCUSSION/SUMMARY
[de-identified] : Today I had a lengthy discussion with the patient regarding their left foot pain. I have addressed all the patient's concerns surrounding the pathology of their condition. I recommend that the patient utilize meloxicam with food once per day as instructed. A prescription for the meloxicam was ordered for the patient in the office today. I recommend the patient undergo a course of physical therapy for the left foot 2-3 times a week for a total of 6-8 weeks. A prescription was given for the physical therapy today. I would like to see the patient back in the office in 2-3 months PRN to reassess their condition. The patient understood and verbally agreed to the treatment plan. All of their questions were answered and they were satisfied with the visit. The patient should call the office if they have any questions or experience worsening symptoms.

## 2020-10-05 NOTE — PHYSICAL EXAM
[de-identified] : General: Alert and oriented x3. In no acute distress. Pleasant in nature with a normal affect. No apparent respiratory distress.\par \par L Foot Exam\par Skin: Clean, dry, intact\par Inspection: No obvious malalignment, no masses, no swelling, no effusion\par Pulses: 2+ DP/PT pulses\par ROM: FOOT Full ROM of digits, ANKLE 10 degrees of dorsiflexion, 40 degrees of plantarflexion, 10 degrees of subtalar motion.\par Painful ROM: None\par Tenderness: No tenderness over the medial malleolus, No tenderness over the lateral malleolus, no CFL/ATFL/PTFL pain, no deltoid ligament pain. No heel pain. No Achilles tenderness. No 5th metatarsal pain. No pain to the LisFranc joint. No ttp over the posterior tibial tendon.\par Stability: Negative anterior/posterior drawer.\par Strength: 5/5 ADD/ABD/TA/GS/EHL/FHL/EDL\par Neuro: Sensation in tact to light touch throughout\par Additional tests: Negative Mortons test, negative tarsal tunnel tinels, negative single heel rise.  [de-identified] : X-rays of the left foot obtained from outside facility on 9/9/2020 and reviewed by me today 10/05/2020. Revealed: No fracture.

## 2021-01-05 ENCOUNTER — RX RENEWAL (OUTPATIENT)
Age: 51
End: 2021-01-05

## 2021-01-05 RX ORDER — MELOXICAM 15 MG/1
15 TABLET ORAL
Qty: 90 | Refills: 0 | Status: ACTIVE | COMMUNITY
Start: 2020-10-05 | End: 1900-01-01

## 2021-03-18 ENCOUNTER — EMERGENCY (EMERGENCY)
Facility: HOSPITAL | Age: 51
LOS: 0 days | Discharge: HOME | End: 2021-03-18
Attending: EMERGENCY MEDICINE
Payer: MEDICARE

## 2021-03-18 VITALS
SYSTOLIC BLOOD PRESSURE: 135 MMHG | HEART RATE: 94 BPM | OXYGEN SATURATION: 99 % | RESPIRATION RATE: 18 BRPM | TEMPERATURE: 98 F | DIASTOLIC BLOOD PRESSURE: 86 MMHG

## 2021-03-18 DIAGNOSIS — S09.90XA UNSPECIFIED INJURY OF HEAD, INITIAL ENCOUNTER: ICD-10-CM

## 2021-03-18 DIAGNOSIS — M54.2 CERVICALGIA: ICD-10-CM

## 2021-03-18 DIAGNOSIS — Z91.018 ALLERGY TO OTHER FOODS: ICD-10-CM
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
## 2021-03-18 DIAGNOSIS — Z04.71 ENCOUNTER FOR EXAMINATION AND OBSERVATION FOLLOWING ALLEGED ADULT PHYSICAL ABUSE: ICD-10-CM

## 2021-03-18 DIAGNOSIS — R51.0 HEADACHE WITH ORTHOSTATIC COMPONENT, NOT ELSEWHERE CLASSIFIED: ICD-10-CM
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
## 2021-03-18 PROCEDURE — 70486 CT MAXILLOFACIAL W/O DYE: CPT | Mod: 26,MA

## 2021-03-18 PROCEDURE — 99285 EMERGENCY DEPT VISIT HI MDM: CPT

## 2021-03-18 PROCEDURE — 72125 CT NECK SPINE W/O DYE: CPT | Mod: 26

## 2021-03-18 PROCEDURE — 70450 CT HEAD/BRAIN W/O DYE: CPT | Mod: 26
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
## 2021-03-18 NOTE — ED ADULT NURSE NOTE - NSIMPLEMENTINTERV_GEN_ALL_ED
Implemented All Universal Safety Interventions:  [employer] to call system. Call bell, personal items and telephone within reach. Instruct patient to call for assistance. Room bathroom lighting operational. Non-slip footwear when patient is off stretcher. Physically safe environment: no spills, clutter or unnecessary equipment. Stretcher in lowest position, wheels locked, appropriate side rails in place.

## 2021-03-18 NOTE — SBIRT NOTE ADULT - NSSBIRTALCPOSREINDET_GEN_A_CORE
Provided SBIRT services: Full screen Negative. Pt defensive when asked about alcohol use. Positive reinforcement provided given patient's reported use is currently within healthy guidelines. Education materials reviewed and given to patient.
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
## 2021-03-18 NOTE — ED PROVIDER NOTE - PATIENT PORTAL LINK FT
You can access the FollowMyHealth Patient Portal offered by [facility] by registering at the following website: [email or web address]. By joining [employer]’s FollowMyHealth portal, you will also be able to view your health information using other applications (apps) compatible with our system.

## 2021-03-18 NOTE — ED PROVIDER NOTE - PROGRESS NOTE DETAILS
Pt given clean dry clothes by social work. CT shows fracture nasal bone and maxilla. Pt has no pain there now, says she has had a hx of this in the past that was repaired. Pt referred to ENT clinic for f/u. Pt ready for d/c home to her shelter. Cab provided through social work.

## 2021-03-18 NOTE — SBIRT NOTE ADULT - NSSBIRTDRGPOSREINDET_GEN_A_CORE
DAST negative.    Pt reports tobacco use- 5/6 cigarettes daily. Tobacco cessation information provided to pt- educated on nicotine replacement options.
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
## 2021-03-18 NOTE — ED PROVIDER NOTE - NSFOLLOWUPCLINICS_GEN_ALL_ED_FT
[facility] - ENT  Otolaryngology (ENT)  [address] [place] [address]  Phone: [phone]  Fax:   Follow Up Time: 7-10 Days
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
## 2023-07-20 ENCOUNTER — APPOINTMENT (OUTPATIENT)
Dept: OTOLARYNGOLOGY | Facility: CLINIC | Age: 53
End: 2023-07-20
Payer: MEDICARE

## 2023-07-20 VITALS
WEIGHT: 183 LBS | DIASTOLIC BLOOD PRESSURE: 82 MMHG | SYSTOLIC BLOOD PRESSURE: 132 MMHG | OXYGEN SATURATION: 98 % | HEART RATE: 78 BPM | TEMPERATURE: 98.5 F | BODY MASS INDEX: 31.24 KG/M2 | HEIGHT: 64 IN

## 2023-07-20 DIAGNOSIS — J34.89 OTHER SPECIFIED DISORDERS OF NOSE AND NASAL SINUSES: ICD-10-CM

## 2023-07-20 DIAGNOSIS — F17.200 NICOTINE DEPENDENCE, UNSPECIFIED, UNCOMPLICATED: ICD-10-CM

## 2023-07-20 DIAGNOSIS — Z78.9 OTHER SPECIFIED HEALTH STATUS: ICD-10-CM

## 2023-07-20 DIAGNOSIS — Z82.2 FAMILY HISTORY OF DEAFNESS AND HEARING LOSS: ICD-10-CM

## 2023-07-20 DIAGNOSIS — F41.9 ANXIETY DISORDER, UNSPECIFIED: ICD-10-CM

## 2023-07-20 PROCEDURE — 99203 OFFICE O/P NEW LOW 30 MIN: CPT | Mod: 25

## 2023-07-20 PROCEDURE — 31231 NASAL ENDOSCOPY DX: CPT

## 2023-07-20 RX ORDER — FLUTICASONE PROPIONATE 50 UG/1
50 SPRAY, METERED NASAL
Refills: 0 | Status: ACTIVE | COMMUNITY

## 2023-07-20 RX ORDER — LORATADINE 10 MG/1
TABLET ORAL
Refills: 0 | Status: ACTIVE | COMMUNITY

## 2023-07-20 RX ORDER — AZELASTINE HYDROCHLORIDE 137 UG/1
SPRAY, METERED NASAL
Refills: 0 | Status: ACTIVE | COMMUNITY

## 2023-07-20 NOTE — ASSESSMENT
[FreeTextEntry1] : We discussed her complaints at length as they appeared closely associated with her nasal symptoms. I explained that her R nose is actually less open than the left and I recommended against further surgery. \par \par I encouraged her to speak with a mental health specialist regarding her related complaints
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
## 2023-07-20 NOTE — DATA REVIEWED
[de-identified] : 12/22 report brought by pt: evidence of prior surgery on her nose; mild scattered mucosal thickening

## 2023-07-20 NOTE — PROCEDURE
[FreeTextEntry6] : Indication: requirement for exam not possible via anterior rhinoscopy; chronic nasal obstruction\par After verbal consent and the administration of an aerosolized phenylephrine/lidocaine mix, examination was performed with a flexible endoscope attached to a video monitoring system. Findings:\par Septum: undulating\par Mucosa: normal\par Polyposis: not present\par Inferior turbinates: R slight hypertrophy, L absent inferior portion of the turb\par Middle and superior turbinates: normal\par Inferior meatus: unremarkable\par Middle meatus: unremarkable\par Superior meatus: unremarkable\par Speno-ethmoidal recess: unremarkable\par Nasopharynx: unremarkable\par Secretions: unremarkable\par Other findings: none

## 2023-07-20 NOTE — PHYSICAL EXAM
[Nasal Endoscopy Performed] : nasal endoscopy was performed, see procedure section for findings [Normal] : no masses and lesions seen, face is symmetric

## 2023-07-20 NOTE — DATA REVIEWED
[de-identified] : 12/22 report brought by pt: evidence of prior surgery on her nose; mild scattered mucosal thickening
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
## 2023-10-30 NOTE — ED STATDOCS - PROVIDER TOKENS
PROVIDER:[TOKEN:[[id number]]] Advancement Flap (Single) Text: - [ ] Because of the orientation, location, and nature of the wound and in order to displace lines around important structures, a advancement flap was planned. After prep and local anesthesia, a Burow's triangle was excised adjacent to the defect.  The other Burow's triangle was displaced to hide incisions within skin relaxation lines. The flap incised and undermined in the deep subcutaneous plane. After hemostasis, the flap was advanced, carried over and closed in a layered fashion.

## 2023-11-20 ENCOUNTER — HOSPITAL ENCOUNTER (EMERGENCY)
Dept: HOSPITAL 74 - JER | Age: 53
Discharge: HOME | End: 2023-11-20
Payer: COMMERCIAL

## 2023-11-20 VITALS
RESPIRATION RATE: 18 BRPM | HEART RATE: 92 BPM | SYSTOLIC BLOOD PRESSURE: 149 MMHG | DIASTOLIC BLOOD PRESSURE: 93 MMHG | TEMPERATURE: 97.8 F

## 2023-11-20 VITALS — BODY MASS INDEX: 32.1 KG/M2

## 2023-11-20 DIAGNOSIS — R51.9: ICD-10-CM

## 2023-11-20 DIAGNOSIS — J34.89: ICD-10-CM

## 2023-11-20 DIAGNOSIS — H92.09: ICD-10-CM

## 2023-11-20 DIAGNOSIS — R09.81: Primary | ICD-10-CM

## 2023-11-20 DIAGNOSIS — Z20.822: ICD-10-CM

## 2023-12-13 NOTE — ED BEHAVIORAL HEALTH ASSESSMENT NOTE - NS ED BHA PLAN ADMIT TO PSYCHIATRY BH CONTACT YN
Airway    Performed by: Neha Flores CRNA  Authorized by: de Ranieri, Aladino, MD    Final Airway Type:  Endotracheal airway  Final Endotracheal Airway*:  ETT  ETT Size (mm)*:  7.5  Cuff*:  Regular  Technique Used for Successful ETT Placement:  Direct laryngoscopy  Intubation Procedure*:  ETCO2  Insertion Site:  Oral  Blade Type*:  MAC  Blade Size*:  4  Measured from*:  Lips  Secured at (cm)*:  22  Placement Verified by: auscultation, capnometry and equal breath sounds    Glottic View*:  2 - partial view of glottis  Attempts*:  1  Number of Other Approaches Attempted:  0   Patient Identified, Procedure confirmed, Emergency equipment available and Safety protocols followed  Location:  OR  Urgency:  Elective  Difficult Airway: No    Indications for Airway Management:  Anesthesia  Spontaneous Ventilation: present    Sedation Level:  Anesthetized  MILS Maintained Throughout: No    Start Time: 12/13/2023 7:17 AM       Yes

## 2024-09-16 NOTE — ED STATDOCS - NS_ATTENDINGSCRIBE_ED_ALL_ED
16-Sep-2024 11:26 I personally performed the service described in the documentation recorded by the scribe in my presence, and it accurately and completely records my words and actions.

## 2024-09-19 ENCOUNTER — EMERGENCY (EMERGENCY)
Age: 54
LOS: 1 days | Discharge: HOME | End: 2024-09-19
Admitting: EMERGENCY MEDICINE
Payer: MEDICARE

## 2024-09-19 VITALS
HEART RATE: 88 BPM | RESPIRATION RATE: 18 BRPM | TEMPERATURE: 98 F | OXYGEN SATURATION: 98 % | DIASTOLIC BLOOD PRESSURE: 79 MMHG | SYSTOLIC BLOOD PRESSURE: 125 MMHG

## 2024-09-19 DIAGNOSIS — N39.0 URINARY TRACT INFECTION, SITE NOT SPECIFIED: ICD-10-CM

## 2024-09-19 DIAGNOSIS — M54.10 RADICULOPATHY, SITE UNSPECIFIED: ICD-10-CM

## 2024-09-19 DIAGNOSIS — Z91.018 ALLERGY TO OTHER FOODS: ICD-10-CM

## 2024-09-19 DIAGNOSIS — G89.29 OTHER CHRONIC PAIN: ICD-10-CM

## 2024-09-19 DIAGNOSIS — M25.561 PAIN IN RIGHT KNEE: ICD-10-CM

## 2024-09-19 DIAGNOSIS — F20.9 SCHIZOPHRENIA, UNSPECIFIED: ICD-10-CM

## 2024-09-19 LAB
APPEARANCE UR: CLEAR — SIGNIFICANT CHANGE UP
BILIRUB UR-MCNC: NEGATIVE — SIGNIFICANT CHANGE UP
COLOR SPEC: YELLOW — SIGNIFICANT CHANGE UP
DIFF PNL FLD: NEGATIVE — SIGNIFICANT CHANGE UP
GLUCOSE UR QL: NEGATIVE MG/DL — SIGNIFICANT CHANGE UP
KETONES UR-MCNC: NEGATIVE MG/DL — SIGNIFICANT CHANGE UP
LEUKOCYTE ESTERASE UR-ACNC: ABNORMAL
NITRITE UR-MCNC: NEGATIVE — SIGNIFICANT CHANGE UP
PH UR: 6.5 — SIGNIFICANT CHANGE UP (ref 5–8)
PROT UR-MCNC: NEGATIVE MG/DL — SIGNIFICANT CHANGE UP
SP GR SPEC: 1.01 — SIGNIFICANT CHANGE UP (ref 1–1.03)
UROBILINOGEN FLD QL: 0.2 MG/DL — SIGNIFICANT CHANGE UP (ref 0.2–1)

## 2024-09-19 PROCEDURE — 99284 EMERGENCY DEPT VISIT MOD MDM: CPT

## 2024-09-19 PROCEDURE — 73564 X-RAY EXAM KNEE 4 OR MORE: CPT | Mod: 26,RT

## 2024-09-19 RX ORDER — NAPROXEN SODIUM 275 MG
1 TABLET ORAL
Qty: 14 | Refills: 0
Start: 2024-09-19

## 2024-09-19 RX ORDER — NAPROXEN SODIUM 275 MG
500 TABLET ORAL ONCE
Refills: 0 | Status: COMPLETED | OUTPATIENT
Start: 2024-09-19 | End: 2024-09-19

## 2024-09-19 RX ORDER — CEFUROXIME SODIUM 1.5 G
1 VIAL (EA) INJECTION
Qty: 14 | Refills: 0
Start: 2024-09-19 | End: 2024-09-25

## 2024-09-19 RX ORDER — CEFUROXIME SODIUM 1.5 G
250 VIAL (EA) INJECTION ONCE
Refills: 0 | Status: COMPLETED | OUTPATIENT
Start: 2024-09-19 | End: 2024-09-19

## 2024-10-15 ENCOUNTER — APPOINTMENT (OUTPATIENT)
Dept: PHYSICAL MEDICINE AND REHAB | Facility: CLINIC | Age: 54
End: 2024-10-15